# Patient Record
Sex: FEMALE | Race: WHITE | Employment: FULL TIME | URBAN - METROPOLITAN AREA
[De-identification: names, ages, dates, MRNs, and addresses within clinical notes are randomized per-mention and may not be internally consistent; named-entity substitution may affect disease eponyms.]

---

## 2017-01-12 ENCOUNTER — OFFICE VISIT (OUTPATIENT)
Dept: FAMILY MEDICINE CLINIC | Age: 57
End: 2017-01-12

## 2017-01-12 VITALS
HEART RATE: 80 BPM | DIASTOLIC BLOOD PRESSURE: 72 MMHG | RESPIRATION RATE: 16 BRPM | SYSTOLIC BLOOD PRESSURE: 112 MMHG | WEIGHT: 238.4 LBS | TEMPERATURE: 98.4 F | BODY MASS INDEX: 45.05 KG/M2

## 2017-01-12 DIAGNOSIS — J02.9 PHARYNGITIS, UNSPECIFIED ETIOLOGY: ICD-10-CM

## 2017-01-12 DIAGNOSIS — J01.40 ACUTE NON-RECURRENT PANSINUSITIS: ICD-10-CM

## 2017-01-12 DIAGNOSIS — Z72.0 TOBACCO ABUSE: ICD-10-CM

## 2017-01-12 DIAGNOSIS — J20.9 ACUTE BRONCHITIS WITH BRONCHOSPASM: Primary | ICD-10-CM

## 2017-01-12 DIAGNOSIS — R09.89 CHEST CONGESTION: ICD-10-CM

## 2017-01-12 PROCEDURE — 99213 OFFICE O/P EST LOW 20 MIN: CPT | Performed by: NURSE PRACTITIONER

## 2017-01-12 PROCEDURE — 99406 BEHAV CHNG SMOKING 3-10 MIN: CPT | Performed by: NURSE PRACTITIONER

## 2017-01-12 RX ORDER — GUAIFENESIN AND DEXTROMETHORPHAN HYDROBROMIDE 100; 10 MG/5ML; MG/5ML
10 SOLUTION ORAL EVERY 6 HOURS PRN
Qty: 400 ML | Refills: 0 | Status: SHIPPED | OUTPATIENT
Start: 2017-01-12 | End: 2017-01-22

## 2017-01-12 RX ORDER — CEFDINIR 300 MG/1
300 CAPSULE ORAL EVERY 12 HOURS
Qty: 20 CAPSULE | Refills: 0 | Status: SHIPPED | OUTPATIENT
Start: 2017-01-12 | End: 2017-01-22

## 2017-01-12 ASSESSMENT — ENCOUNTER SYMPTOMS
PHOTOPHOBIA: 0
CONSTIPATION: 0
VOMITING: 0
ANAL BLEEDING: 0
BLOOD IN STOOL: 0
ABDOMINAL PAIN: 0
TROUBLE SWALLOWING: 0
WHEEZING: 0
ABDOMINAL DISTENTION: 0
NAUSEA: 0
SORE THROAT: 1
APNEA: 0
DIARRHEA: 0
VOICE CHANGE: 1
COUGH: 1
SHORTNESS OF BREATH: 0
BACK PAIN: 0
SINUS PRESSURE: 1

## 2017-10-06 ENCOUNTER — OFFICE VISIT (OUTPATIENT)
Dept: FAMILY MEDICINE CLINIC | Age: 57
End: 2017-10-06
Payer: COMMERCIAL

## 2017-10-06 VITALS
HEART RATE: 92 BPM | SYSTOLIC BLOOD PRESSURE: 122 MMHG | WEIGHT: 232.5 LBS | BODY MASS INDEX: 43.9 KG/M2 | RESPIRATION RATE: 16 BRPM | HEIGHT: 61 IN | TEMPERATURE: 99 F | DIASTOLIC BLOOD PRESSURE: 84 MMHG

## 2017-10-06 DIAGNOSIS — L03.113 CELLULITIS OF RIGHT UPPER EXTREMITY: Primary | ICD-10-CM

## 2017-10-06 DIAGNOSIS — L98.9 BENIGN SKIN LESION OF NOSE: ICD-10-CM

## 2017-10-06 DIAGNOSIS — Z72.0 TOBACCO ABUSE: ICD-10-CM

## 2017-10-06 PROCEDURE — 99213 OFFICE O/P EST LOW 20 MIN: CPT | Performed by: NURSE PRACTITIONER

## 2017-10-06 RX ORDER — CEPHALEXIN 500 MG/1
500 CAPSULE ORAL EVERY 6 HOURS
Qty: 28 CAPSULE | Refills: 0 | Status: SHIPPED | OUTPATIENT
Start: 2017-10-06 | End: 2017-10-13

## 2017-10-06 RX ORDER — LORATADINE 10 MG/1
10 CAPSULE, LIQUID FILLED ORAL DAILY
Qty: 10 CAPSULE | Refills: 0 | Status: SHIPPED | OUTPATIENT
Start: 2017-10-06 | End: 2017-10-16

## 2017-10-06 ASSESSMENT — ENCOUNTER SYMPTOMS
CONSTIPATION: 0
VOMITING: 0
COUGH: 0
PHOTOPHOBIA: 0
NAUSEA: 0
ABDOMINAL DISTENTION: 0
WHEEZING: 0
SHORTNESS OF BREATH: 0
DIARRHEA: 0
ABDOMINAL PAIN: 0
ANAL BLEEDING: 0
BACK PAIN: 0
TROUBLE SWALLOWING: 0
APNEA: 0
BLOOD IN STOOL: 0
VOICE CHANGE: 0

## 2017-10-06 NOTE — PATIENT INSTRUCTIONS
treatment right away. Follow-up care is a key part of your treatment and safety. Be sure to make and go to all appointments, and call your doctor if you are having problems. It's also a good idea to know your test results and keep a list of the medicines you take. How can you care for yourself at home? · Take your antibiotics as directed. Do not stop taking them just because you feel better. You need to take the full course of antibiotics. · Prop up the infected area on pillows to reduce pain and swelling. Try to keep the area above the level of your heart as often as you can. · If your doctor told you how to care for your wound, follow your doctor's instructions. If you did not get instructions, follow this general advice:  ¨ Wash the wound with clean water 2 times a day. Don't use hydrogen peroxide or alcohol, which can slow healing. ¨ You may cover the wound with a thin layer of petroleum jelly, such as Vaseline, and a nonstick bandage. ¨ Apply more petroleum jelly and replace the bandage as needed. · Be safe with medicines. Take pain medicines exactly as directed. ¨ If the doctor gave you a prescription medicine for pain, take it as prescribed. ¨ If you are not taking a prescription pain medicine, ask your doctor if you can take an over-the-counter medicine. To prevent cellulitis in the future  · Try to prevent cuts, scrapes, or other injuries to your skin. Cellulitis most often occurs where there is a break in the skin. · If you get a scrape, cut, mild burn, or bite, wash the wound with clean water as soon as you can to help avoid infection. Don't use hydrogen peroxide or alcohol, which can slow healing. · If you have swelling in your legs (edema), support stockings and good skin care may help prevent leg sores and cellulitis. · Take care of your feet, especially if you have diabetes or other conditions that increase the risk of infection. Wear shoes and socks. Do not go barefoot.  If you have athlete's foot or other skin problems on your feet, talk to your doctor about how to treat them. When should you call for help? Call your doctor now or seek immediate medical care if:  · You have signs that your infection is getting worse, such as:  ¨ Increased pain, swelling, warmth, or redness. ¨ Red streaks leading from the area. ¨ Pus draining from the area. ¨ A fever. · You get a rash. Watch closely for changes in your health, and be sure to contact your doctor if:  · You are not getting better after 1 day (24 hours). · You do not get better as expected. Where can you learn more? Go to https://PawClinicpepiceweb.Inetec. org and sign in to your Bday account. Enter J654 in the oroeco box to learn more about \"Cellulitis: Care Instructions. \"     If you do not have an account, please click on the \"Sign Up Now\" link. Current as of: October 13, 2016  Content Version: 11.3  © 4088-7172 [employer]. Care instructions adapted under license by [facility] ([facility]). If you have questions about a medical condition or this instruction, always ask your healthcare professional. [place] [address] any warranty or liability for your use of this information. Stopping Smoking: Care Instructions  Your Care Instructions  Cigarette smokers crave the nicotine in cigarettes. Giving it up is much harder than simply changing a habit. Your body has to stop craving the nicotine. It is hard to quit, but you can do it. There are many tools that people use to quit smoking. You may find that combining tools works best for you. There are several steps to quitting. First you get ready to quit. Then you get support to help you. After that, you learn new skills and behaviors to become a nonsmoker. For many people, a necessary step is getting and using medicine. Your doctor will help you set up the plan that best meets your needs.  You may want to attend a smoking cessation program to help you quit smoking. When you choose a program, look for one that has proven success. Ask your doctor for ideas. You will greatly increase your chances of success if you take medicine as well as get counseling or join a cessation program.  Some of the changes you feel when you first quit tobacco are uncomfortable. Your body will miss the nicotine at first, and you may feel short-tempered and grumpy. You may have trouble sleeping or concentrating. Medicine can help you deal with these symptoms. You may struggle with changing your smoking habits and rituals. The last step is the tricky one: Be prepared for the smoking urge to continue for a time. This is a lot to deal with, but keep at it. You will feel better. Follow-up care is a key part of your treatment and safety. Be sure to make and go to all appointments, and call your doctor if you are having problems. Its also a good idea to know your test results and keep a list of the medicines you take. How can you care for yourself at home? · Ask your family, friends, and coworkers for support. You have a better chance of quitting if you have help and support. · Join a support group, such as Nicotine Anonymous, for people who are trying to quit smoking. · Consider signing up for a smoking cessation program, such as the American Lung Association's Freedom from Smoking program.  · Set a quit date. Pick your date carefully so that it is not right in the middle of a big deadline or stressful time. Once you quit, do not even take a puff. Get rid of all ashtrays and lighters after your last cigarette. Clean your house and your clothes so that they do not smell of smoke. · Learn how to be a nonsmoker. Think about ways you can avoid those things that make you reach for a cigarette. ¨ Avoid situations that put you at greatest risk for smoking. For some people, it is hard to have a drink with friends without smoking.  For others, they might skip a coffee break with coworkers who smoke. ¨ Change your daily routine. Take a different route to work or eat a meal in a different place. · Cut down on stress. Calm yourself or release tension by doing an activity you enjoy, such as reading a book, taking a hot bath, or gardening. · Talk to your doctor or pharmacist about nicotine replacement therapy, which replaces the nicotine in your body. You still get nicotine but you do not use tobacco. Nicotine replacement products help you slowly reduce the amount of nicotine you need. These products come in several forms, many of them available over-the-counter:  ¨ Nicotine patches  ¨ Nicotine gum and lozenges  ¨ Nicotine inhaler  · Ask your doctor about bupropion (Wellbutrin) or varenicline (Chantix), which are prescription medicines. They do not contain nicotine. They help you by reducing withdrawal symptoms, such as stress and anxiety. · Some people find hypnosis, acupuncture, and massage helpful for ending the smoking habit. · Eat a healthy diet and get regular exercise. Having healthy habits will help your body move past its craving for nicotine. · Be prepared to keep trying. Most people are not successful the first few times they try to quit. Do not get mad at yourself if you smoke again. Make a list of things you learned and think about when you want to try again, such as next week, next month, or next year. Where can you learn more? Go to https://American-Albanian Hemp Companylaurel.Ecrio. org and sign in to your iCracked account. Enter Y849 in the [facility] box to learn more about \"Stopping Smoking: Care Instructions. \"     If you do not have an account, please click on the \"Sign Up Now\" link. Current as of: March 20, 2017  Content Version: 11.3  © 6927-1773 [employer]. Care instructions adapted under license by [address]  If you have questions about a medical condition or this instruction, always ask your healthcare professional. Stacy Grullon disclaims any warranty or liability for your use of this information.

## 2017-10-06 NOTE — MR AVS SNAPSHOT
After Visit Summary             Amanda Ragsdale   10/6/2017 2:30 PM   Office Visit    Description:  Female : 1960   Provider:  Laure Martinez CNP   Department:  [facility]              Your Follow-Up and Future Appointments         Below is a list of your follow-up and future appointments. This may not be a complete list as you may have made appointments directly with providers that we are not aware of or your providers may have made some for you. Please call your providers to confirm appointments. It is important to keep your appointments. Please bring your current insurance card, photo ID, co-pay, and all medication bottles to your appointment. If self-pay, payment is expected at the time of service. Your To-Do List     Follow-Up    Return if symptoms worsen or fail to improve. Information from Your Visit        Department     Name Address Phone [facility] [address] [place], [place] [phone]      You Were Seen for:         Comments    Cellulitis of right upper extremity   [[id number]]         Vital Signs     Blood Pressure Pulse Temperature Respirations Height Weight    122/84 (Site: Right Arm, Position: Sitting) 92 99 °F (37.2 °C) (Oral) 16 5' 1\" (1.549 m) 232 lb 8 oz (105.5 kg)    Body Mass Index Smoking Status                43.93 kg/m2 Current Every Day Smoker          Additional Information about your Body Mass Index (BMI)           Your BMI as listed above is considered obese (30 or more). BMI is an estimate of body fat, calculated from your height and weight. The higher your BMI, the greater your risk of heart disease, high blood pressure, type 2 diabetes, stroke, gallstones, arthritis, sleep apnea, and certain cancers. BMI is not perfect. It may overestimate body fat in athletes and people who are more muscular.   Even a small weight loss (between 5 and 10 percent of your current weight) by decreasing your calorie intake and becoming more physically active will help lower your risk of developing or worsening diseases associated with obesity. Learn more at: Smartling.uk          Instructions        Orders Placed:  No orders of the defined types were placed in this encounter. Medications Prescribed:  Orders Placed This Encounter   Medications    mupirocin (BACTROBAN) 2 % ointment     Sig: Apply 3 times daily to affected areas. Dispense:  15 g     Refill:  0    cephALEXin (KEFLEX) 500 MG capsule     Sig: Take 1 capsule by mouth every 6 hours for 7 days     Dispense:  28 capsule     Refill:  0    loratadine (CLARITIN) 10 MG capsule     Sig: Take 1 capsule by mouth daily for 10 days     Dispense:  10 capsule     Refill:  0       Claritin 10 mg orally once a day for 10 days. Cephalexin 500 mg orally every 6 hours for 7 days. Take a probiotic (such as the store brand equivalent of Align, Culturelle or Florastor) daily while you are taking the antibiotic. Separate the probiotic and the antibiotic by at least 2 hours. Continue taking the probiotic for 2 weeks after completing the antibiotic. Do NOT take probiotics if you are immunocompromised (i.e., on chemotherapy or taking immunosuppressive drugs following a transplant). Be aware that some probiotics contain gluten. If you are intolerant of gluten, check with the pharmacist before your purchase of probiotics. Apply hydrocortisone 1% cream to right arm two times per day for 7 days. Apply mupirocin 2% ointment to rash on nose and chin three times per day for 7 days. Apply ice to affected area for 15 minutes three times per day for 2 days then apply moist heat to area for 15 minutes three times per day. Smoking cessation is advised. Call with any concerns. Return if symptoms worsen or fail to improve.          Cellulitis: Care Instructions  Your Care Instructions Cellulitis is a skin infection. It often occurs after a break in the skin from a scrape, cut, bite, or puncture, or after a rash. The doctor has checked you carefully, but problems can develop later. If you notice any problems or new symptoms, get medical treatment right away. Follow-up care is a key part of your treatment and safety. Be sure to make and go to all appointments, and call your doctor if you are having problems. It's also a good idea to know your test results and keep a list of the medicines you take. How can you care for yourself at home? · Take your antibiotics as directed. Do not stop taking them just because you feel better. You need to take the full course of antibiotics. · Prop up the infected area on pillows to reduce pain and swelling. Try to keep the area above the level of your heart as often as you can. · If your doctor told you how to care for your wound, follow your doctor's instructions. If you did not get instructions, follow this general advice:  ¨ Wash the wound with clean water 2 times a day. Don't use hydrogen peroxide or alcohol, which can slow healing. ¨ You may cover the wound with a thin layer of petroleum jelly, such as Vaseline, and a nonstick bandage. ¨ Apply more petroleum jelly and replace the bandage as needed. · Be safe with medicines. Take pain medicines exactly as directed. ¨ If the doctor gave you a prescription medicine for pain, take it as prescribed. ¨ If you are not taking a prescription pain medicine, ask your doctor if you can take an over-the-counter medicine. To prevent cellulitis in the future  · Try to prevent cuts, scrapes, or other injuries to your skin. Cellulitis most often occurs where there is a break in the skin. · If you get a scrape, cut, mild burn, or bite, wash the wound with clean water as soon as you can to help avoid infection. Don't use hydrogen peroxide or alcohol, which can slow healing. There are several steps to quitting. First you get ready to quit. Then you get support to help you. After that, you learn new skills and behaviors to become a nonsmoker. For many people, a necessary step is getting and using medicine. Your doctor will help you set up the plan that best meets your needs. You may want to attend a smoking cessation program to help you quit smoking. When you choose a program, look for one that has proven success. Ask your doctor for ideas. You will greatly increase your chances of success if you take medicine as well as get counseling or join a cessation program.  Some of the changes you feel when you first quit tobacco are uncomfortable. Your body will miss the nicotine at first, and you may feel short-tempered and grumpy. You may have trouble sleeping or concentrating. Medicine can help you deal with these symptoms. You may struggle with changing your smoking habits and rituals. The last step is the tricky one: Be prepared for the smoking urge to continue for a time. This is a lot to deal with, but keep at it. You will feel better. Follow-up care is a key part of your treatment and safety. Be sure to make and go to all appointments, and call your doctor if you are having problems. Its also a good idea to know your test results and keep a list of the medicines you take. How can you care for yourself at home? · Ask your family, friends, and coworkers for support. You have a better chance of quitting if you have help and support. · Join a support group, such as Nicotine Anonymous, for people who are trying to quit smoking. · Consider signing up for a smoking cessation program, such as the American Lung Association's Freedom from Smoking program.  · Set a quit date. Pick your date carefully so that it is not right in the middle of a big deadline or stressful time. Once you quit, do not even take a puff.  Get rid of all ashtrays and lighters after your last cigarette. Clean your house and your clothes so that they do not smell of smoke. · Learn how to be a nonsmoker. Think about ways you can avoid those things that make you reach for a cigarette. ¨ Avoid situations that put you at greatest risk for smoking. For some people, it is hard to have a drink with friends without smoking. For others, they might skip a coffee break with coworkers who smoke. ¨ Change your daily routine. Take a different route to work or eat a meal in a different place. · Cut down on stress. Calm yourself or release tension by doing an activity you enjoy, such as reading a book, taking a hot bath, or gardening. · Talk to your doctor or pharmacist about nicotine replacement therapy, which replaces the nicotine in your body. You still get nicotine but you do not use tobacco. Nicotine replacement products help you slowly reduce the amount of nicotine you need. These products come in several forms, many of them available over-the-counter:  ¨ Nicotine patches  ¨ Nicotine gum and lozenges  ¨ Nicotine inhaler  · Ask your doctor about bupropion (Wellbutrin) or varenicline (Chantix), which are prescription medicines. They do not contain nicotine. They help you by reducing withdrawal symptoms, such as stress and anxiety. · Some people find hypnosis, acupuncture, and massage helpful for ending the smoking habit. · Eat a healthy diet and get regular exercise. Having healthy habits will help your body move past its craving for nicotine. · Be prepared to keep trying. Most people are not successful the first few times they try to quit. Do not get mad at yourself if you smoke again. Make a list of things you learned and think about when you want to try again, such as next week, next month, or next year. Where can you learn more? Go to https://leonardo.healthHaloSource. org and sign in to your Gecko account.  Enter C758 in the Ingresse box to learn more about \"Stopping Smoking: Care Instructions. \"     If you do not have an account, please click on the \"Sign Up Now\" link. Current as of: March 20, 2017  Content Version: 11.3  © 6643-7147 [employer]. Care instructions adapted under license by [facility] ([facility]). If you have questions about a medical condition or this instruction, always ask your healthcare professional. [place] [address] any warranty or liability for your use of this information. Today's Medication Changes          These changes are accurate as of: 10/6/17  2:58 PM.  If you have any questions, ask your nurse or doctor. START taking these medications           cephALEXin 500 MG capsule   Commonly known as:  KEFLEX   Instructions: Take 1 capsule by mouth every 6 hours for 7 days   Quantity:  28 capsule   Refills:  0   Started by:  Lubna Baker CNP       loratadine 10 MG capsule   Commonly known as:  CLARITIN   Instructions: Take 1 capsule by mouth daily for 10 days   Quantity:  10 capsule   Refills:  0   Started by:  Lubna Baker CNP       mupirocin 2 % ointment   Commonly known as:  BACTROBAN   Instructions:  Apply 3 times daily to affected areas.    Quantity:  15 g   Refills:  0   Started by:  Lubna Baker CNP         STOP taking these medications           atorvastatin 10 MG tablet   Commonly known as:  LIPITOR   Stopped by:  Lubna Baker CNP       trimethoprim-polymyxin b 50003-1.1 UNIT/ML-% ophthalmic solution   Commonly known as:  POLYTRIM   Stopped by:  Lubna Baker CNP            Where to Get Your Medications      These medications were sent to [address] [place], [place] [address] [phone]  [facility] [address], [facility] [address]     Phone:  [phone]     cephALEXin 500 MG capsule    loratadine 10 MG capsule    mupirocin 2 % ointment               Your Current Medications Are mupirocin (BACTROBAN) 2 % ointment Apply 3 times daily to affected areas. cephALEXin (KEFLEX) 500 MG capsule Take 1 capsule by mouth every 6 hours for 7 days    loratadine (CLARITIN) 10 MG capsule Take 1 capsule by mouth daily for 10 days    ibuprofen (ADVIL;MOTRIN) 200 MG tablet Take 200 mg by mouth every 6 hours as needed for Pain Taking 3 tabs      Allergies              Eggs Or Egg-derived Products Other (See Comments)    Abdominal Pain- Non Pasturized eggs         Additional Information        Basic Information     Date Of Birth Sex Race Ethnicity Preferred Language    1960 Female White Non-/Non  English      Problem List as of 10/6/2017  Date Reviewed: 10/6/2017                IFG (impaired fasting glucose)    Hyperlipidemia    Tobacco abuse    Morbid obesity ([facility] [address]      Preventive Care        Date Due    Tetanus Combination Vaccine (1 - Tdap) 10/30/1977    Pneumococcal Vaccine - Pneumovax for adults aged 19-64 years with: chronic heart disease, chronic lung disease, diabetes mellitus, alcoholism, chronic liver disease, or cigarette smoking. (1 of 1 - PPSV23) 10/30/1977    Colonoscopy 10/30/2008    Yearly Flu Vaccine (1) 9/1/2017    Pap Smear 3/11/2018    Mammograms are recommended every 2 years for low/average risk patients aged 48 - 69, and every year for high risk patients per updated national guidelines. However these guidelines can be individualized by your provider. 4/1/2018    Diabetes Screening 4/1/2019    Cholesterol Screening 3/15/2021            [employer] Signup           [employer] Corporationhart allows you to send messages to your doctor, view your test results, renew your prescriptions, schedule appointments, view visit notes, and more. How Do I Sign Up? 1. In your Internet browser, go to [email or web address]  2. Click on the Sign Up Now link in the Sign In box. You will see the New Member Sign Up page. 3. Enter your cuaQea Access Code exactly as it appears below. You will not need to use this code after youve completed the sign-up process. If you do not sign up before the expiration date, you must request a new code. cuaQea Access Code: [email or web address]  Expires: 12/5/2017  2:58 PM    4. Enter your Social Security Number ([id number]) and Date of Birth (mm/dd/yyyy) as indicated and click Submit. You will be taken to the next sign-up page. 5. Create a cuaQea ID. This will be your cuaQea login ID and cannot be changed, so think of one that is secure and easy to remember. 6. Create a cuaQea password. You can change your password at any time. 7. Enter your Password Reset Question and Answer. This can be used at a later time if you forget your password. 8. Enter your e-mail address. You will receive e-mail notification when new information is available in [address]. 9. Click Sign Up. You can now view your medical record. Additional Information  If you have questions, please contact the physician practice where you receive care. Remember, cuaQea is NOT to be used for urgent needs. For medical emergencies, dial 911. For questions regarding your cuaQea account call [phone]. If you have a clinical question, please call your doctor's office.

## 2017-10-06 NOTE — PROGRESS NOTES
Shon Monzon is a 60 y. o.female is here today for:  Chief Complaint   Patient presents with    Flu Vaccine     here today for flu shot area red,swollen,hot and itchy- right deltoid- done at [facility] Other     nose turns red on right side, hurts         The patient is here today with a complaint of erythema, warmth, pain and itching of the RUE after receiving an influenza vaccine. She received her influenza vaccine on 10/4/17. The patient noted her current symptoms on 10/5/17. The patient has been afebrile. She reports having intermittent erythema, itching and skin breakouts on the right side of her nose. The symptoms began after her sinus surgery three years ago and occur intermittently. Review of Systems   Constitutional: Negative for activity change, appetite change, diaphoresis, fatigue, fever and unexpected weight change. HENT: Negative for dental problem, hearing loss, nosebleeds, tinnitus, trouble swallowing and voice change. Eyes: Negative for photophobia and visual disturbance. Respiratory: Negative for apnea, cough, shortness of breath and wheezing. Cardiovascular: Negative for chest pain, palpitations and leg swelling. Gastrointestinal: Negative for abdominal distention, abdominal pain, anal bleeding, blood in stool, constipation, diarrhea, nausea and vomiting. Genitourinary: Negative for difficulty urinating, dysuria, frequency, hematuria, pelvic pain, urgency, vaginal bleeding and vaginal discharge. Musculoskeletal: Negative for arthralgias, back pain, joint swelling, myalgias and neck stiffness. Skin: Negative for rash. Erythema right upper arm. Allergic/Immunologic: Negative for immunocompromised state. Neurological: Negative for dizziness, tremors, seizures, syncope, weakness and headaches. Hematological: Negative for adenopathy. Does not bruise/bleed easily.    Psychiatric/Behavioral: Negative for decreased concentration, dysphoric mood,

## 2017-12-06 ENCOUNTER — OFFICE VISIT (OUTPATIENT)
Dept: FAMILY MEDICINE CLINIC | Age: 57
End: 2017-12-06
Payer: COMMERCIAL

## 2017-12-06 VITALS
HEIGHT: 61 IN | HEART RATE: 88 BPM | RESPIRATION RATE: 18 BRPM | TEMPERATURE: 99.1 F | WEIGHT: 234.8 LBS | BODY MASS INDEX: 44.33 KG/M2 | DIASTOLIC BLOOD PRESSURE: 70 MMHG | SYSTOLIC BLOOD PRESSURE: 118 MMHG

## 2017-12-06 DIAGNOSIS — Z72.0 TOBACCO ABUSE: ICD-10-CM

## 2017-12-06 DIAGNOSIS — J01.41 ACUTE RECURRENT PANSINUSITIS: Primary | ICD-10-CM

## 2017-12-06 PROCEDURE — 99213 OFFICE O/P EST LOW 20 MIN: CPT | Performed by: FAMILY MEDICINE

## 2017-12-06 RX ORDER — AMOXICILLIN AND CLAVULANATE POTASSIUM 875; 125 MG/1; MG/1
1 TABLET, FILM COATED ORAL 2 TIMES DAILY
Qty: 20 TABLET | Refills: 0 | Status: SHIPPED | OUTPATIENT
Start: 2017-12-06 | End: 2017-12-16

## 2017-12-06 ASSESSMENT — ENCOUNTER SYMPTOMS
SORE THROAT: 1
GASTROINTESTINAL NEGATIVE: 1
COUGH: 1
SINUS PAIN: 1
SINUS PRESSURE: 1
RHINORRHEA: 1

## 2017-12-06 NOTE — LETTER
[facility]  [address] [place] [address] Rd., [address] [id number]  Phone: [phone]  Fax: [phone]    Nish Israel MD        December 6, 2017     Patient: Amanda Ragsdale   YOB: 1960   Date of Visit: 12/6/2017       To Whom It May Concern: It is my medical opinion that Ciara Awad may return to work on 12/7/17. If you have any questions or concerns, please don't hesitate to call.     Sincerely,        Nish Israel MD

## 2018-04-22 ENCOUNTER — HOSPITAL ENCOUNTER (EMERGENCY)
Age: 58
Discharge: HOME | End: 2018-04-22
Payer: COMMERCIAL

## 2018-04-22 VITALS
HEART RATE: 80 BPM | TEMPERATURE: 99.2 F | OXYGEN SATURATION: 95 % | WEIGHT: 207 LBS | BODY MASS INDEX: 39.11 KG/M2 | DIASTOLIC BLOOD PRESSURE: 66 MMHG | SYSTOLIC BLOOD PRESSURE: 129 MMHG | RESPIRATION RATE: 16 BRPM

## 2018-04-22 DIAGNOSIS — J06.9 ACUTE UPPER RESPIRATORY INFECTION: Primary | ICD-10-CM

## 2018-04-22 LAB
GROUP A STREP CULTURE, REFLEX: NEGATIVE
REFLEX THROAT C + S: NORMAL

## 2018-04-22 PROCEDURE — 99214 OFFICE O/P EST MOD 30 MIN: CPT

## 2018-04-22 PROCEDURE — 99213 OFFICE O/P EST LOW 20 MIN: CPT | Performed by: NURSE PRACTITIONER

## 2018-04-22 PROCEDURE — 87070 CULTURE OTHR SPECIMN AEROBIC: CPT

## 2018-04-22 RX ORDER — CEFDINIR 300 MG/1
300 CAPSULE ORAL 2 TIMES DAILY
Qty: 20 CAPSULE | Refills: 0 | Status: SHIPPED | OUTPATIENT
Start: 2018-04-22 | End: 2018-05-02

## 2018-04-22 ASSESSMENT — ENCOUNTER SYMPTOMS
DIARRHEA: 0
ABDOMINAL PAIN: 0
SHORTNESS OF BREATH: 0
COUGH: 1
WHEEZING: 0
NAUSEA: 0
SINUS PRESSURE: 1
SINUS PAIN: 1
SORE THROAT: 1
CONSTIPATION: 0

## 2018-04-22 ASSESSMENT — PAIN DESCRIPTION - LOCATION: LOCATION: THROAT

## 2018-04-22 ASSESSMENT — PAIN DESCRIPTION - FREQUENCY: FREQUENCY: CONTINUOUS

## 2018-04-22 ASSESSMENT — PAIN DESCRIPTION - DESCRIPTORS: DESCRIPTORS: ACHING

## 2018-04-22 ASSESSMENT — PAIN DESCRIPTION - PAIN TYPE: TYPE: ACUTE PAIN

## 2018-04-22 ASSESSMENT — PAIN SCALES - GENERAL: PAINLEVEL_OUTOF10: 5

## 2018-04-24 LAB — THROAT/NOSE CULTURE: NORMAL

## 2018-04-26 ENCOUNTER — OFFICE VISIT (OUTPATIENT)
Dept: FAMILY MEDICINE CLINIC | Age: 58
End: 2018-04-26
Payer: COMMERCIAL

## 2018-04-26 VITALS
HEART RATE: 88 BPM | DIASTOLIC BLOOD PRESSURE: 82 MMHG | TEMPERATURE: 98.5 F | RESPIRATION RATE: 16 BRPM | WEIGHT: 240.3 LBS | BODY MASS INDEX: 45.37 KG/M2 | HEIGHT: 61 IN | SYSTOLIC BLOOD PRESSURE: 132 MMHG

## 2018-04-26 DIAGNOSIS — J06.9 VIRAL URI WITH COUGH: Primary | ICD-10-CM

## 2018-04-26 PROCEDURE — G0444 DEPRESSION SCREEN ANNUAL: HCPCS | Performed by: FAMILY MEDICINE

## 2018-04-26 PROCEDURE — 99213 OFFICE O/P EST LOW 20 MIN: CPT | Performed by: FAMILY MEDICINE

## 2018-04-26 ASSESSMENT — PATIENT HEALTH QUESTIONNAIRE - PHQ9
2. FEELING DOWN, DEPRESSED OR HOPELESS: 3
7. TROUBLE CONCENTRATING ON THINGS, SUCH AS READING THE NEWSPAPER OR WATCHING TELEVISION: 3
10. IF YOU CHECKED OFF ANY PROBLEMS, HOW DIFFICULT HAVE THESE PROBLEMS MADE IT FOR YOU TO DO YOUR WORK, TAKE CARE OF THINGS AT HOME, OR GET ALONG WITH OTHER PEOPLE: 1
3. TROUBLE FALLING OR STAYING ASLEEP: 3
8. MOVING OR SPEAKING SO SLOWLY THAT OTHER PEOPLE COULD HAVE NOTICED. OR THE OPPOSITE, BEING SO FIGETY OR RESTLESS THAT YOU HAVE BEEN MOVING AROUND A LOT MORE THAN USUAL: 0
SUM OF ALL RESPONSES TO PHQ QUESTIONS 1-9: 17
4. FEELING TIRED OR HAVING LITTLE ENERGY: 3
1. LITTLE INTEREST OR PLEASURE IN DOING THINGS: 3
6. FEELING BAD ABOUT YOURSELF - OR THAT YOU ARE A FAILURE OR HAVE LET YOURSELF OR YOUR FAMILY DOWN: 0
SUM OF ALL RESPONSES TO PHQ9 QUESTIONS 1 & 2: 6
9. THOUGHTS THAT YOU WOULD BE BETTER OFF DEAD, OR OF HURTING YOURSELF: 0
5. POOR APPETITE OR OVEREATING: 2

## 2018-04-26 ASSESSMENT — ENCOUNTER SYMPTOMS
SHORTNESS OF BREATH: 0
VOMITING: 0
DIARRHEA: 0
ABDOMINAL PAIN: 0
CONSTIPATION: 0
BLOOD IN STOOL: 0
ANAL BLEEDING: 0
CHEST TIGHTNESS: 0
NAUSEA: 1

## 2018-07-19 ENCOUNTER — OFFICE VISIT (OUTPATIENT)
Dept: FAMILY MEDICINE CLINIC | Age: 58
End: 2018-07-19
Payer: COMMERCIAL

## 2018-07-19 VITALS
TEMPERATURE: 99.3 F | WEIGHT: 246 LBS | HEART RATE: 84 BPM | BODY MASS INDEX: 46.48 KG/M2 | RESPIRATION RATE: 16 BRPM | SYSTOLIC BLOOD PRESSURE: 126 MMHG | DIASTOLIC BLOOD PRESSURE: 72 MMHG

## 2018-07-19 DIAGNOSIS — J06.9 URI WITH COUGH AND CONGESTION: Primary | ICD-10-CM

## 2018-07-19 DIAGNOSIS — Z72.0 TOBACCO ABUSE: ICD-10-CM

## 2018-07-19 PROCEDURE — 99213 OFFICE O/P EST LOW 20 MIN: CPT | Performed by: NURSE PRACTITIONER

## 2018-07-19 RX ORDER — PREDNISONE 20 MG/1
20 TABLET ORAL 2 TIMES DAILY
Qty: 10 TABLET | Refills: 0 | Status: SHIPPED | OUTPATIENT
Start: 2018-07-19 | End: 2018-07-24

## 2018-07-19 RX ORDER — BENZONATATE 100 MG/1
100 CAPSULE ORAL 3 TIMES DAILY PRN
Qty: 21 CAPSULE | Refills: 0 | Status: SHIPPED | OUTPATIENT
Start: 2018-07-19 | End: 2018-07-26

## 2018-07-19 RX ORDER — AMOXICILLIN 500 MG/1
500 CAPSULE ORAL 2 TIMES DAILY
Qty: 20 CAPSULE | Refills: 0 | Status: SHIPPED | OUTPATIENT
Start: 2018-07-19 | End: 2018-07-24 | Stop reason: ALTCHOICE

## 2018-07-19 ASSESSMENT — ENCOUNTER SYMPTOMS
DIARRHEA: 0
NAUSEA: 0
SORE THROAT: 0
SINUS PAIN: 0
SHORTNESS OF BREATH: 0
COUGH: 1
WHEEZING: 0
CONSTIPATION: 0
ABDOMINAL PAIN: 0

## 2018-07-19 NOTE — PROGRESS NOTES
normal strength. No cranial nerve deficit. Coordination and gait normal. GCS eye subscore is 4. GCS verbal subscore is 5. GCS motor subscore is 6. Skin: Skin is warm and dry. She is not diaphoretic. Psychiatric: She has a normal mood and affect. Her speech is normal and behavior is normal. Judgment and thought content normal. Cognition and memory are normal.   Nursing note and vitals reviewed. Assessment/Plan:      Juan Chacon was seen today for cough and congestion. She appears ill and tired, is nontoxic. Expiratory wheezing throughout all lung fields, TMs and posterior oropharynx normal and shows no cervical adenopathy. Patient does not have a formal history of COPD, but she has the hallmarks. Will be treated with an antibiotic and given the day off work. Diagnoses and all orders for this visit:    URI with cough and congestion  -     amoxicillin (AMOXIL) 500 MG capsule; Take 1 capsule by mouth 2 times daily for 10 days  -     predniSONE (DELTASONE) 20 MG tablet; Take 1 tablet by mouth 2 times daily for 5 days  -     benzonatate (TESSALON PERLES) 100 MG capsule; Take 1 capsule by mouth 3 times daily as needed for Cough    Tobacco abuse  -     predniSONE (DELTASONE) 20 MG tablet; Take 1 tablet by mouth 2 times daily for 5 days        Return if symptoms worsen or fail to improve. Patient instructions given and reviewed.         Electronically signed by NIKI Kraft CNP on 7/19/2018 at 11:52 AM

## 2018-07-19 NOTE — PATIENT INSTRUCTIONS
Patient Education        Learning About the Safe Use of Antibiotics  Introduction    Antibiotics are drugs used to kill bacteria. Bacteria can cause infections. These include strep throat, ear infections, and pneumonia. These medicines can't cure everything. They don't kill viruses or help with allergies. They don't help illnesses such as the common cold, the flu, or a runny nose. And they can cause side effects. There are many types of antibiotics. Your doctor will decide which one will work best for your infection. Examples include:  · Amoxicillin. · Cephalexin (Keflex). · Ciprofloxacin (Cipro). What are the possible side effects? Side effects can include:  · Nausea. · Diarrhea. · Skin rash. · Yeast infection. · A severe allergic reaction. It may cause itching, swelling, and breathing problems. This is rare. You may have other side effects or reactions not listed here. Check the information that comes with your medicine. Should you take antibiotics just in case? Don't take antibiotics when you don't need them. If you do that, they may not work when you do need them. Each time you take antibiotics, you are more likely to have some bacteria that survive and aren't killed by the medicine. Bacteria that don't die can change and become even harder to kill. These are called antibiotic-resistant bacteria. They can cause longer and more serious infections. To treat them, you may need different, stronger antibiotics that have more side effects and may cost more. So always ask your doctor if antibiotics are the best treatment. Explain that you do not want antibiotics unless you need them. Help protect the community  Using antibiotics when they're not needed leads to the development of antibiotic-resistant bacteria. These tougher bacteria can spread to family members, children, and coworkers.  People in your community will have a risk of getting an infection that is harder to cure and that costs more to infections at home? · Do not smoke. Avoid secondhand smoke. · Take an over-the-counter pain medicine, such as acetaminophen (Tylenol), ibuprofen (Advil, Motrin), or naproxen (Aleve). Read and follow all instructions on the label. · Be careful when taking over-the-counter cold or flu medicines and Tylenol at the same time. Many of these medicines have acetaminophen, which is Tylenol. Read the labels to make sure that you are not taking more than the recommended dose. Too much acetaminophen (Tylenol) can be harmful. · If your doctor prescribed antibiotics, take them as directed. Do not stop taking them just because you feel better. You need to take the full course of antibiotics. · Ask your doctor about cough medicines and decongestants. Some doctors recommend these medicines, while others feel that they do not help. · Learn breathing techniques for COPD, such as breathing through pursed lips. These techniques can help you breathe easier. What can you do to prevent these infections? Stay healthy  · Do not smoke. This is the most important step you can take to prevent more damage to your lungs. If you need help quitting, talk to your doctor about stop-smoking programs and medicines. These can increase your chances of quitting for good. · Avoid secondhand smoke, air pollution, and high altitudes. Also avoid cold, dry air and hot, humid air. Stay at home with your windows closed when air pollution is bad. · Get a flu shot every year. · Get a pneumococcal vaccine shot. If you have had one before, ask your doctor whether you need another dose. · If you must be around people with colds or the flu, wash your hands often. Exercise and eat well  · If your doctor recommends it, get more exercise. Walking is a good choice. Bit by bit, increase the amount you walk every day. Try for at least 30 minutes on most days of the week. · Eat regular, well-balanced meals.  Eating right keeps your energy levels up and helps your body fight infection. · Get plenty of rest and sleep. Follow-up care is a key part of your treatment and safety. Be sure to make and go to all appointments, and call your doctor if you are having problems. It's also a good idea to know your test results and keep a list of the medicines you take. Where can you learn more? Go to https://JavaJobspepiceweb.[email or web address].com. org and sign in to your CloudBees account. Enter R559 in the [employer] box to learn more about \"Learning About COPD and Upper Respiratory Infections. \"     If you do not have an account, please click on the \"Sign Up Now\" link. Current as of: December 6, 2017  Content Version: 11.6  © 4712-5694 [employer]. Care instructions adapted under license by [facility] ([facility]). If you have questions about a medical condition or this instruction, always ask your healthcare professional. [place] [address] any warranty or liability for your use of this information.

## 2018-07-21 ENCOUNTER — HOSPITAL ENCOUNTER (EMERGENCY)
Dept: GENERAL RADIOLOGY | Age: 58
Discharge: HOME | End: 2018-07-21
Payer: COMMERCIAL

## 2018-07-21 ENCOUNTER — HOSPITAL ENCOUNTER (EMERGENCY)
Age: 58
Discharge: HOME | End: 2018-07-21
Payer: COMMERCIAL

## 2018-07-21 VITALS
RESPIRATION RATE: 22 BRPM | WEIGHT: 246 LBS | HEART RATE: 82 BPM | DIASTOLIC BLOOD PRESSURE: 88 MMHG | TEMPERATURE: 99.1 F | SYSTOLIC BLOOD PRESSURE: 142 MMHG | BODY MASS INDEX: 46.48 KG/M2 | OXYGEN SATURATION: 94 %

## 2018-07-21 DIAGNOSIS — R06.2 WHEEZING: ICD-10-CM

## 2018-07-21 DIAGNOSIS — R06.02 SHORTNESS OF BREATH: Primary | ICD-10-CM

## 2018-07-21 DIAGNOSIS — R05.9 COUGH: ICD-10-CM

## 2018-07-21 DIAGNOSIS — Z72.0 TOBACCO USE: ICD-10-CM

## 2018-07-21 LAB
ABSOLUTE BASO #: 0 /CMM (ref 0–200)
ABSOLUTE EOS #: 0 /CMM (ref 0–500)
ABSOLUTE LYMPH #: 700 /CMM (ref 1000–4800)
ABSOLUTE MONO #: 400 /CMM (ref 0–800)
ABSOLUTE NEUT #: 7700 /CMM (ref 1800–7700)
ANION GAP SERPL CALCULATED.3IONS-SCNC: 10 MMOL/L (ref 4–12)
B-TYPE NATRIURETIC PEPTIDE: 55 PG/ML (ref 0–100)
BASOPHILS RELATIVE PERCENT: 0.3 % (ref 0–2)
BUN BLDV-MCNC: 9 MG/DL (ref 7–20)
CALCIUM SERPL-MCNC: 8.7 MG/DL (ref 8.8–10.5)
CHLORIDE BLD-SCNC: 102 MEQ/L (ref 101–111)
CO2: 26 MEQ/L (ref 21–32)
CREAT SERPL-MCNC: 0.74 MG/DL (ref 0.6–1.3)
CREATININE CLEARANCE: >60
EKG ATRIAL RATE: 73 BPM
EKG P AXIS: 58 DEGREES
EKG P-R INTERVAL: 152 MS
EKG Q-T INTERVAL: 380 MS
EKG QRS DURATION: 74 MS
EKG QTC CALCULATION (BAZETT): 418 MS
EKG R AXIS: 30 DEGREES
EKG T AXIS: 40 DEGREES
EKG VENTRICULAR RATE: 73 BPM
EOSINOPHILS RELATIVE PERCENT: 0.1 % (ref 0–6)
GLUCOSE: 119 MG/DL (ref 70–110)
HCT VFR BLD CALC: 40.8 % (ref 35–44)
HEMOGLOBIN: 13.5 GM/DL (ref 12–15)
LYMPHOCYTES RELATIVE PERCENT: 8.2 % (ref 15–45)
MCH RBC QN AUTO: 27 PG (ref 27.5–33)
MCHC RBC AUTO-ENTMCNC: 33.1 GM/DL (ref 33–36)
MCV RBC AUTO: 81.6 CU MIC (ref 80–97)
MONOCYTES RELATIVE PERCENT: 4.6 % (ref 2–10)
NEUTROPHILS RELATIVE PERCENT: 86.8 % (ref 40–70)
NUCLEATED RBCS: 0 /100 WBC
PDW BLD-RTO: 16.4 % (ref 12–16)
PLATELET # BLD: 174 TH/CMM (ref 150–400)
POTASSIUM SERPL-SCNC: 3.1 MEQ/L (ref 3.6–5)
RBC # BLD: 5.01 MIL/CMM (ref 4–5.1)
SODIUM BLD-SCNC: 138 MEQ/L (ref 135–145)
TROPONIN I: 0.01 NG/ML (ref 0–0.02)
WBC # BLD: 8.8 TH/CMM (ref 4.4–10.5)

## 2018-07-21 PROCEDURE — 99214 OFFICE O/P EST MOD 30 MIN: CPT | Performed by: NURSE PRACTITIONER

## 2018-07-21 PROCEDURE — 6360000002 HC RX W HCPCS: Performed by: NURSE PRACTITIONER

## 2018-07-21 PROCEDURE — 2709999900 HC NON-CHARGEABLE SUPPLY

## 2018-07-21 PROCEDURE — 93005 ELECTROCARDIOGRAM TRACING: CPT | Performed by: NURSE PRACTITIONER

## 2018-07-21 PROCEDURE — 96372 THER/PROPH/DIAG INJ SC/IM: CPT

## 2018-07-21 PROCEDURE — 99215 OFFICE O/P EST HI 40 MIN: CPT

## 2018-07-21 PROCEDURE — 93010 ELECTROCARDIOGRAM REPORT: CPT | Performed by: INTERNAL MEDICINE

## 2018-07-21 PROCEDURE — 71046 X-RAY EXAM CHEST 2 VIEWS: CPT

## 2018-07-21 PROCEDURE — 6370000000 HC RX 637 (ALT 250 FOR IP): Performed by: NURSE PRACTITIONER

## 2018-07-21 RX ORDER — IPRATROPIUM BROMIDE AND ALBUTEROL SULFATE 2.5; .5 MG/3ML; MG/3ML
1 SOLUTION RESPIRATORY (INHALATION) ONCE
Status: COMPLETED | OUTPATIENT
Start: 2018-07-21 | End: 2018-07-21

## 2018-07-21 RX ORDER — METHYLPREDNISOLONE SODIUM SUCCINATE 125 MG/2ML
80 INJECTION, POWDER, LYOPHILIZED, FOR SOLUTION INTRAMUSCULAR; INTRAVENOUS ONCE
Status: COMPLETED | OUTPATIENT
Start: 2018-07-21 | End: 2018-07-21

## 2018-07-21 RX ADMIN — METHYLPREDNISOLONE SODIUM SUCCINATE 80 MG: 125 INJECTION, POWDER, FOR SOLUTION INTRAMUSCULAR; INTRAVENOUS at 09:18

## 2018-07-21 RX ADMIN — IPRATROPIUM BROMIDE AND ALBUTEROL SULFATE 1 AMPULE: .5; 3 SOLUTION RESPIRATORY (INHALATION) at 09:23

## 2018-07-21 ASSESSMENT — ENCOUNTER SYMPTOMS
CHEST TIGHTNESS: 0
NAUSEA: 0
COUGH: 1
SORE THROAT: 1
WHEEZING: 1
DIARRHEA: 0
SINUS PRESSURE: 0
ABDOMINAL PAIN: 0
RHINORRHEA: 0
VOMITING: 0
SHORTNESS OF BREATH: 1

## 2018-07-21 NOTE — ED TRIAGE NOTES
Patient brought to  7 by wheelchair, weak, SOB, at front window. 5 days cough, wheezing, SOB, seen PCP 3 days ago,meds given, c/o feeling worse  Today, bilat rib pain, chest congestion, harsh cough,sore throat, EKG done, patient won't cough, due to rib pain in ribs. duo- neb started tolerating well,patient to x- ray by wheelchair.

## 2018-07-21 NOTE — ED PROVIDER NOTES
[facility]  Urgent Care Encounter      CHIEF COMPLAINT       Chief Complaint   Patient presents with    Cough     wheezing, SOB    Pharyngitis    Rib Pain     bilat. Nurses Notes reviewed and I agree except as noted in the HPI. HISTORY OF PRESENT ILLNESS   Berenice Enamorado is a 59 y.o. female who presents for evaluation of shortness of breath, wheezing, bilateral rib rib pain from coughing, and dry cough that has been present since 18. She was seen by her PCP on 18 for similar symptoms. She was prescribed prednisone, amoxicillin, and Tessalon Perles. She has been taking these faithfully with worsening of symptoms. She has had pneumonia in the past and is afraid she may have it again. Current tobacco smoker. No history of PE or DVT. Identifiable risk factors are smoking and obesity. She does not use home oxygen or CPAP. REVIEW OF SYSTEMS     Review of Systems   Constitutional: Positive for fever. Negative for chills and fatigue. HENT: Positive for sore throat. Negative for congestion, ear pain, postnasal drip, rhinorrhea and sinus pressure. Respiratory: Positive for cough, shortness of breath and wheezing. Negative for chest tightness. Cardiovascular: Negative for chest pain. Gastrointestinal: Negative for abdominal pain, diarrhea, nausea and vomiting. Skin: Negative for rash. Allergic/Immunologic: Negative for environmental allergies and food allergies. Neurological: Negative for headaches. PAST MEDICAL HISTORY         Diagnosis Date    Hyperlipidemia 2016    IFG (impaired fasting glucose) 2016    Morbid obesity ([address] 3/11/2015       SURGICAL HISTORY     Patient  has a past surgical history that includes Rotator cuff repair ();  section (, ); Hysterectomy (~); and Septoplasty (2016).     CURRENT MEDICATIONS       Previous Medications    AMOXICILLIN (AMOXIL) 500 MG CAPSULE    Take 1 capsule by mouth 2 times daily for 10 days    BENZONATATE (TESSALON PERLES) 100 MG CAPSULE    Take 1 capsule by mouth 3 times daily as needed for Cough    DEXTROMETHORPHAN-GUAIFENESIN (ROBITUSSIN DM PO)    Take by mouth    IBUPROFEN (ADVIL;MOTRIN) 200 MG TABLET    Take 200 mg by mouth every 6 hours as needed for Pain Taking 3 tabs    PREDNISONE (DELTASONE) 20 MG TABLET    Take 1 tablet by mouth 2 times daily for 5 days       ALLERGIES     Patient is has No Known Allergies. FAMILY HISTORY     Patient's family history includes Cancer in her father and mother; Diabetes in her father; Heart Disease in her maternal grandfather, maternal grandmother, paternal grandfather, and paternal grandmother; High Blood Pressure in her maternal grandfather, maternal grandmother, paternal grandfather, and paternal grandmother. SOCIAL HISTORY     Patient  reports that she has been smoking Cigarettes. She has a 17.50 pack-year smoking history. She has never used smokeless tobacco. She reports that she drinks alcohol. She reports that she does not use drugs. PHYSICAL EXAM     ED TRIAGE VITALS  BP: 135/85, Temp: 99.1 °F (37.3 °C), Pulse: 76, Resp: 22 (coaching patinet to slow down breathing.), SpO2: 93 % (93%-95%)  Physical Exam   Constitutional: She is oriented to person, place, and time. Vital signs are normal. She appears well-developed and well-nourished. She is cooperative. She appears ill. No distress. HENT:   Head: Normocephalic and atraumatic. Right Ear: External ear normal.   Left Ear: External ear normal.   Nose: Nose normal.   Mouth/Throat: Mucous membranes are normal.   Eyes: Conjunctivae are normal.   Neck: Normal range of motion and full passive range of motion without pain. No neck rigidity. Cardiovascular: Normal rate. Pulmonary/Chest: Accessory muscle usage present. No respiratory distress. She has decreased breath sounds (throughout). She has wheezes (scattered expiratory throughout).    Neurological: She is alert and oriented to person, place, and time. Skin: Skin is warm and dry. No rash (on exposed surfaces) noted. She is not diaphoretic. Psychiatric: She has a normal mood and affect. Her speech is normal.   Nursing note and vitals reviewed. DIAGNOSTIC RESULTS   Labs:  Results for orders placed or performed during the hospital encounter of 07/21/18   EKG 12 Lead   Result Value Ref Range    Ventricular Rate 73 BPM    Atrial Rate 73 BPM    P-R Interval 152 ms    QRS Duration 74 ms    Q-T Interval 380 ms    QTc Calculation (Bazett) 418 ms    P Axis 58 degrees    R Axis 30 degrees    T Axis 40 degrees       IMAGING:  XR CHEST STANDARD (2 VW)   Final Result      No acute intrathoracic process. **This report has been created using voice recognition software. It may contain minor errors which are inherent in voice recognition technology. **      Final report electronically signed by Dr. Zachary Wilson on 7/21/2018 9:41 AM        URGENT CARE COURSE:     Vitals:    07/21/18 0932   BP: 135/85   Pulse: 76   Resp: 22   Temp: 99.1 °F (37.3 °C)   TempSrc: Oral   SpO2: 93%   Weight: 246 lb (111.6 kg)       Medications   ipratropium-albuterol (DUONEB) nebulizer solution 1 ampule (1 ampule Inhalation Given 7/21/18 0923)   methylPREDNISolone sodium (SOLU-MEDROL) injection 80 mg (80 mg Intramuscular Given 7/21/18 0918)     PROCEDURES:  None  FINAL IMPRESSION      1. Shortness of breath    2. Wheezing    3. Tobacco use    4. Cough        DISPOSITION/PLAN   DISPOSITION    AMA   No improvement Solu-Medrol and DuoNeb. Patient appears uncomfortable and she agrees that she is. She is  is using accessory muscles, and she is slightly hypoxic, wheezing, and decreased lung sounds continue. Patient is so uncomfortable and symptomatic that she presents in wheelchair and had to be wheeled from exam room to x-ray room and back. She does not use home oxygen or CPAP.   Chest x-ray is negative for any acute process but patient could benefit from CT

## 2018-07-23 ENCOUNTER — TELEPHONE (OUTPATIENT)
Dept: FAMILY MEDICINE CLINIC | Age: 58
End: 2018-07-23

## 2018-07-23 NOTE — TELEPHONE ENCOUNTER
Spoke with patient. She is having severe left rib pain during coughing spells. Her cough is harsh and deep. States that the hospital changed her ATB to Levaquin on 7/21/18. She is also on prednisone and 800 mg ibuprofen every 8 hours. She was using [employer] initially but it did nothing for her. I have scheduled an appt with TM tomorrow morning but patient/son would like to know if there is anything else that can be done in the interim.

## 2018-07-24 ENCOUNTER — OFFICE VISIT (OUTPATIENT)
Dept: FAMILY MEDICINE CLINIC | Age: 58
End: 2018-07-24
Payer: COMMERCIAL

## 2018-07-24 VITALS
OXYGEN SATURATION: 90 % | SYSTOLIC BLOOD PRESSURE: 116 MMHG | RESPIRATION RATE: 92 BRPM | HEART RATE: 76 BPM | DIASTOLIC BLOOD PRESSURE: 80 MMHG | TEMPERATURE: 98.1 F

## 2018-07-24 DIAGNOSIS — J44.9 CHRONIC OBSTRUCTIVE PULMONARY DISEASE, UNSPECIFIED COPD TYPE (HCC): ICD-10-CM

## 2018-07-24 DIAGNOSIS — J18.9 PNEUMONIA OF RIGHT LUNG DUE TO INFECTIOUS ORGANISM, UNSPECIFIED PART OF LUNG: Primary | ICD-10-CM

## 2018-07-24 DIAGNOSIS — R06.2 WHEEZING: ICD-10-CM

## 2018-07-24 PROCEDURE — 99214 OFFICE O/P EST MOD 30 MIN: CPT | Performed by: NURSE PRACTITIONER

## 2018-07-24 RX ORDER — GUAIFENESIN AND CODEINE PHOSPHATE 100; 10 MG/5ML; MG/5ML
5 SOLUTION ORAL 3 TIMES DAILY PRN
Qty: 60 ML | Refills: 0 | Status: SHIPPED | OUTPATIENT
Start: 2018-07-24 | End: 2018-07-28

## 2018-07-24 RX ORDER — LEVOFLOXACIN 5 MG/ML
500 INJECTION, SOLUTION INTRAVENOUS
COMMUNITY
End: 2020-11-17

## 2018-07-24 RX ORDER — IPRATROPIUM BROMIDE AND ALBUTEROL SULFATE 2.5; .5 MG/3ML; MG/3ML
1 SOLUTION RESPIRATORY (INHALATION) EVERY 6 HOURS
Qty: 360 ML | Refills: 0 | Status: SHIPPED | OUTPATIENT
Start: 2018-07-24 | End: 2020-11-17

## 2018-07-24 ASSESSMENT — ENCOUNTER SYMPTOMS
DIARRHEA: 0
COUGH: 1
ABDOMINAL PAIN: 0
SHORTNESS OF BREATH: 1
CHEST TIGHTNESS: 1
SORE THROAT: 0
WHEEZING: 1
CONSTIPATION: 0
NAUSEA: 0

## 2018-07-24 NOTE — PATIENT INSTRUCTIONS
Go to the emergency department if your symptoms worsen in the next 48 hours. Patient Education        Pneumonia: Care Instructions  Your Care Instructions    Pneumonia is an infection of the lungs. Most cases are caused by infections from bacteria or viruses. Pneumonia may be mild or very severe. If it is caused by bacteria, you will be treated with antibiotics. It may take a few weeks to a few months to recover fully from pneumonia, depending on how sick you were and whether your overall health is good. Follow-up care is a key part of your treatment and safety. Be sure to make and go to all appointments, and call your doctor if you are having problems. It's also a good idea to know your test results and keep a list of the medicines you take. How can you care for yourself at home? · Take your antibiotics exactly as directed. Do not stop taking the medicine just because you are feeling better. You need to take the full course of antibiotics. · Take your medicines exactly as prescribed. Call your doctor if you think you are having a problem with your medicine. · Get plenty of rest and sleep. You may feel weak and tired for a while, but your energy level will improve with time. · To prevent dehydration, drink plenty of fluids, enough so that your urine is light yellow or clear like water. Choose water and other caffeine-free clear liquids until you feel better. If you have kidney, heart, or liver disease and have to limit fluids, talk with your doctor before you increase the amount of fluids you drink. · Take care of your cough so you can rest. A cough that brings up mucus from your lungs is common with pneumonia. It is one way your body gets rid of the infection. But if coughing keeps you from resting or causes severe fatigue and chest-wall pain, talk to your doctor. He or she may suggest that you take a medicine to reduce the cough. · Use a vaporizer or humidifier to add moisture to your bedroom.  Follow the directions for cleaning the machine. · Do not smoke or allow others to smoke around you. Smoke will make your cough last longer. If you need help quitting, talk to your doctor about stop-smoking programs and medicines. These can increase your chances of quitting for good. · Take an over-the-counter pain medicine, such as acetaminophen (Tylenol), ibuprofen (Advil, Motrin), or naproxen (Aleve). Read and follow all instructions on the label. · Do not take two or more pain medicines at the same time unless the doctor told you to. Many pain medicines have acetaminophen, which is Tylenol. Too much acetaminophen (Tylenol) can be harmful. · If you were given a spirometer to measure how well your lungs are working, use it as instructed. This can help your doctor tell how your recovery is going. · To prevent pneumonia in the future, talk to your doctor about getting a flu vaccine (once a year) and a pneumococcal vaccine (one time only for most people). When should you call for help? Call 911 anytime you think you may need emergency care. For example, call if:    · You have severe trouble breathing.    Call your doctor now or seek immediate medical care if:    · You cough up dark brown or bloody mucus (sputum).     · You have new or worse trouble breathing.     · You are dizzy or lightheaded, or you feel like you may faint.    Watch closely for changes in your health, and be sure to contact your doctor if:    · You have a new or higher fever.     · You are coughing more deeply or more often.     · You are not getting better after 2 days (48 hours).     · You do not get better as expected. Where can you learn more? Go to https://ParastructurepeMyLikes.Powerit Solutions. org and sign in to your [email or web address] account. Enter D336 in the [employer] box to learn more about \"Pneumonia: Care Instructions. \"     If you do not have an account, please click on the \"Sign Up Now\" link.   Current as of: December 6, 2017  Content Version: antibiotics. · Breathe moist air from a humidifier, hot shower, or sink filled with hot water. This may help ease your symptoms and make it easier for you to breathe. · If you have congestion in your nose and throat, drinking plenty of fluids, especially hot fluids, may help relieve your symptoms. If you have kidney, heart, or liver disease and have to limit fluids, talk with your doctor before you increase the amount of fluids you drink. · If you have mucus in your airways, it may help to breathe deeply and cough. · Do not smoke or allow others to smoke around you. Smoking can make your wheezing worse. If you need help quitting, talk to your doctor about stop-smoking programs and medicines. These can increase your chances of quitting for good. · Avoid things that may cause your wheezing. These may include colds, smoke, air pollution, dust, pollen, pets, cockroaches, stress, and cold air. When should you call for help? Call 911 anytime you think you may need emergency care. For example, call if:    · You have severe trouble breathing.     · You passed out (lost consciousness).    Call your doctor now or seek immediate medical care if:    · You cough up yellow, dark brown, or bloody mucus (sputum).     · You have new or worse shortness of breath.     · Your wheezing is not getting better or it gets worse after you start taking your medicine.    Watch closely for changes in your health, and be sure to contact your doctor if:    · You do not get better as expected. Where can you learn more? Go to https://LSEOlaurel.SocialDial. org and sign in to your B[employer] Motion account. Enter 685 [id number] in the [facility] box to learn more about \"Wheezing or Bronchoconstriction: Care Instructions. \"     If you do not have an account, please click on the \"Sign Up Now\" link. Current as of: December 6, 2017  Content Version: 11.6  © 0608-5659 [employer].  Care instructions adapted under license by [address] [employer]. Be sure to make and go to all appointments, and call your doctor if you are having problems. It's also a good idea to know your test results and keep a list of the medicines you take. Where can you learn more? Go to https://chlaurel.Anytime DD. org and sign in to your Pocket account. Enter N426 in the [employer] box to learn more about \"Learning About COPD and How to Prevent Lung Infections. \"     If you do not have an account, please click on the \"Sign Up Now\" link. Current as of: December 6, 2017  Content Version: 11.6  © 9370-2010 [employer]. Care instructions adapted under license by [facility] ([facility]). If you have questions about a medical condition or this instruction, always ask your healthcare professional. [address] any warranty or liability for your use of this information.

## 2018-07-24 NOTE — PROGRESS NOTES
(TUSSI-ORGANIDIN NR) 100-10 MG/5ML syrup, Take 5 mLs by mouth 3 times daily as needed for Cough for up to 4 days. ., Disp: 60 mL, Rfl: 0    predniSONE (DELTASONE) 20 MG tablet, Take 1 tablet by mouth 2 times daily for 5 days, Disp: 10 tablet, Rfl: 0    Dextromethorphan-Guaifenesin (ROBITUSSIN DM PO), Take by mouth, Disp: , Rfl:     benzonatate (TESSALON PERLES) 100 MG capsule, Take 1 capsule by mouth 3 times daily as needed for Cough, Disp: 21 capsule, Rfl: 0    ibuprofen (ADVIL;MOTRIN) 200 MG tablet, Take 200 mg by mouth every 6 hours as needed for Pain Taking 3 tabs, Disp: , Rfl:     The patient has No Known Allergies. Past Medical History  Staci Wallis  has a past medical history of Hyperlipidemia; IFG (impaired fasting glucose); and Morbid obesity ([address] Subjective:      Review of Systems   Constitutional: Positive for fatigue. Negative for appetite change, diaphoresis and fever. HENT: Negative for congestion, sore throat and tinnitus. Eyes: Negative for visual disturbance. Respiratory: Positive for cough, chest tightness, shortness of breath and wheezing. Cardiovascular: Positive for chest pain. Negative for leg swelling. Gastrointestinal: Negative for abdominal pain, constipation, diarrhea and nausea. Genitourinary: Negative for frequency. Musculoskeletal: Negative for arthralgias, myalgias and neck stiffness. Skin: Negative for rash. Neurological: Negative for dizziness, weakness and headaches. Psychiatric/Behavioral: The patient is not nervous/anxious. All other systems reviewed and are negative. Objective:     /80   Pulse 76   Temp 98.1 °F (36.7 °C) (Oral)   Resp (!) 92   SpO2 90%     Physical Exam   Constitutional: She is oriented to person, place, and time. She appears well-developed and well-nourished. She is cooperative. She appears ill. No distress.    HENT:   Right Ear: Hearing, tympanic membrane, external ear and ear canal normal.   Left Ear: Hearing, tympanic membrane, external ear and ear canal normal.   Nose: Nose normal. No mucosal edema or rhinorrhea. Mouth/Throat: Uvula is midline, oropharynx is clear and moist and mucous membranes are normal. No uvula swelling. No oropharyngeal exudate, posterior oropharyngeal edema or posterior oropharyngeal erythema. Eyes: Conjunctivae, EOM and lids are normal. Pupils are equal, round, and reactive to light. Right eye exhibits no discharge. Left eye exhibits no discharge. Neck: Full passive range of motion without pain. No muscular tenderness present. Cardiovascular: Normal rate, regular rhythm and normal heart sounds. Pulmonary/Chest: Accessory muscle usage present. No respiratory distress. She has wheezes in the right upper field, the right middle field, the right lower field, the left upper field, the left middle field and the left lower field. She has no rales. Lymphadenopathy:     She has no cervical adenopathy. Neurological: She is alert and oriented to person, place, and time. She has normal strength. No cranial nerve deficit. Coordination and gait normal. GCS eye subscore is 4. GCS verbal subscore is 5. GCS motor subscore is 6. Skin: Skin is warm and dry. She is not diaphoretic. Psychiatric: She has a normal mood and affect. Her speech is normal and behavior is normal. Judgment and thought content normal. Cognition and memory are normal.   Nursing note and vitals reviewed. Assessment/Plan:      Chaka Santoyo was seen today for cough, chest pain and shortness of breath. She has a significant coughing episode when first entering the room for her examination. She does eventually settle and is able to converse. She has extensive expiratory wheezing throughout all lung fields, and it is difficult to hear if she has any rales. She states she was told she has right sided pneumonia. She appears quite tired but nontoxic, is afebrile. Pulse ox 92%. She is not using any kind of inhalers.   She smells very heavily of cigarette smoke and has obviously smoked prior to coming to the office. TMs and posterior oropharynx are normal and she has no cervical adenopathy. She has been taking her medication as prescribed, not using any kind of cough medicine. Discussed with the patient at length that she is teeterin on a hospital admission. Also challenged her about her smoking and that she likely has COPDwhich she states she has never been told. After discussion with her and her son, it was agreed that we would prescribe an aerosol machine for home breathing treatments, and Virtussin for cough control. Patient is in agreement with this as she would prefer not to go back to the emergency department at this point. They're given strict instructions that if her situation deteriorates at all, she is go directly to the emergency department, calling EMS if necessary. Diagnoses and all orders for this visit:    Pneumonia of right lung due to infectious organism, unspecified part of lung  -     DME Order for Nebulizer as OP  -     ipratropium-albuterol (DUONEB) 0.5-2.5 (3) MG/3ML SOLN nebulizer solution; Take 3 mLs by nebulization every 6 hours  -     guaiFENesin-codeine (TUSSI-ORGANIDIN NR) 100-10 MG/5ML syrup; Take 5 mLs by mouth 3 times daily as needed for Cough for up to 4 days. .    Wheezing  -     ipratropium-albuterol (DUONEB) 0.5-2.5 (3) MG/3ML SOLN nebulizer solution; Take 3 mLs by nebulization every 6 hours      Return if symptoms worsen or fail to improve. Patient instructions given and reviewed. At least 30 minutes was spent with the patient in evaluation, consultation and decision making.      Electronically signed by NIKI Caldwell CNP on 7/24/2018 at 9:09 AM

## 2020-11-17 ENCOUNTER — HOSPITAL ENCOUNTER (EMERGENCY)
Age: 60
Discharge: HOME | End: 2020-11-17
Payer: COMMERCIAL

## 2020-11-17 VITALS
RESPIRATION RATE: 18 BRPM | TEMPERATURE: 97.4 F | HEIGHT: 61 IN | HEART RATE: 98 BPM | BODY MASS INDEX: 49.09 KG/M2 | SYSTOLIC BLOOD PRESSURE: 143 MMHG | WEIGHT: 260 LBS | OXYGEN SATURATION: 96 % | DIASTOLIC BLOOD PRESSURE: 74 MMHG

## 2020-11-17 PROCEDURE — 99213 OFFICE O/P EST LOW 20 MIN: CPT

## 2020-11-17 PROCEDURE — 99214 OFFICE O/P EST MOD 30 MIN: CPT | Performed by: NURSE PRACTITIONER

## 2020-11-17 RX ORDER — AMOXICILLIN AND CLAVULANATE POTASSIUM 875; 125 MG/1; MG/1
1 TABLET, FILM COATED ORAL 2 TIMES DAILY
Qty: 20 TABLET | Refills: 0 | Status: SHIPPED | OUTPATIENT
Start: 2020-11-17 | End: 2020-11-27

## 2020-11-17 ASSESSMENT — ENCOUNTER SYMPTOMS
EYE DISCHARGE: 0
SHORTNESS OF BREATH: 0
SORE THROAT: 0
EYE REDNESS: 0
TROUBLE SWALLOWING: 0
RHINORRHEA: 1
SINUS PAIN: 1
SINUS PRESSURE: 1
COUGH: 0
NAUSEA: 0
DIARRHEA: 0
VOMITING: 0

## 2020-11-17 NOTE — ED PROVIDER NOTES
Medications    IBUPROFEN (ADVIL;MOTRIN) 200 MG TABLET    Take 200 mg by mouth every 6 hours as needed for Pain Taking 3 tabs       ALLERGIES     Patient is has No Known Allergies. FAMILY HISTORY     Patient'sfamily history includes Cancer in her father and mother; Diabetes in her father; Heart Disease in her maternal grandfather, maternal grandmother, paternal grandfather, and paternal grandmother; High Blood Pressure in her maternal grandfather, maternal grandmother, paternal grandfather, and paternal grandmother. SOCIAL HISTORY     Patient  reports that she has been smoking cigarettes. She has a 17.50 pack-year smoking history. She has never used smokeless tobacco. She reports current alcohol use. She reports that she does not use drugs. PHYSICAL EXAM     ED TRIAGE VITALS  BP: (!) 143/74, Temp: 97.4 °F (36.3 °C), Pulse: 98, Resp: 18, SpO2: 96 %  Physical Exam  Vitals signs and nursing note reviewed. Constitutional:       General: She is not in acute distress. Appearance: Normal appearance. She is well-developed. She is not ill-appearing, toxic-appearing or diaphoretic. HENT:      Head: Normocephalic and atraumatic. Right Ear: Hearing, tympanic membrane, ear canal and external ear normal. No mastoid tenderness. No hemotympanum. Tympanic membrane is not perforated, erythematous or bulging. Left Ear: Hearing, tympanic membrane, ear canal and external ear normal. No mastoid tenderness. No hemotympanum. Tympanic membrane is not perforated, erythematous or bulging. Nose: Mucosal edema and congestion present. No rhinorrhea. Right Sinus: Maxillary sinus tenderness present. No frontal sinus tenderness. Left Sinus: Maxillary sinus tenderness present. No frontal sinus tenderness. Mouth/Throat:      Mouth: Mucous membranes are moist.      Pharynx: Oropharynx is clear. Uvula midline. Tonsils: No tonsillar abscesses. Eyes:      General: No scleral icterus. Conjunctiva/sclera: Conjunctivae normal.      Right eye: Right conjunctiva is not injected. No hemorrhage. Left eye: Left conjunctiva is not injected. No hemorrhage. Neck:      Musculoskeletal: Normal range of motion and neck supple. Thyroid: No thyromegaly. Trachea: Trachea normal.   Cardiovascular:      Rate and Rhythm: Normal rate and regular rhythm. No extrasystoles are present. Chest Wall: PMI is not displaced. Heart sounds: Normal heart sounds. No murmur. No friction rub. No gallop. Pulmonary:      Effort: Pulmonary effort is normal. No respiratory distress. Breath sounds: Normal breath sounds. Lymphadenopathy:      Head:      Right side of head: No submental, submandibular, tonsillar or occipital adenopathy. Left side of head: No submental, submandibular, tonsillar or occipital adenopathy. Cervical: No cervical adenopathy. Upper Body:      Right upper body: No supraclavicular adenopathy. Left upper body: No supraclavicular adenopathy. Skin:     General: Skin is warm and dry. Capillary Refill: Capillary refill takes less than 2 seconds. Coloration: Skin is not pale. Findings: No rash. Comments: Skin warm and dry to touch, no rashes noted on exposed surfaces. Neurological:      Mental Status: She is alert and oriented to person, place, and time. She is not disoriented. Psychiatric:         Mood and Affect: Mood normal.         Behavior: Behavior is cooperative. DIAGNOSTIC RESULTS   Labs: No results found for this visit on 11/17/20. IMAGING:  No orders to display     URGENT CARE COURSE:     Vitals:    11/17/20 1133   BP: (!) 143/74   Pulse: 98   Resp: 18   Temp: 97.4 °F (36.3 °C)   TempSrc: Temporal   SpO2: 96%   Weight: 260 lb (117.9 kg)   Height: 5' 1\" (1.549 m)       Medications - No data to display  PROCEDURES:  None  FINALIMPRESSION      1. Acute non-recurrent maxillary sinusitis    2. Loss of smell    3.  Loss of taste    4. Close exposure to COVID-19 virus        DISPOSITION/PLAN   DISPOSITION Decision To Discharge 11/17/2020 12:02:02 PM  Nontoxic, no acute distress. Treat for acute sinusitis. Patient has Covid results pending that should be resulted tomorrow. Loss of taste and smell with close exposure to Covid. Advised to self isolate. Continue current medicine. Increase fluids. Sinus rinse twice daily. If worsening go to ER. PATIENT REFERRED TO:  Marisa Lara MD  [facility]  [phone]    In 1 week  Follow up as needed. Medication as prescribed, continue current medication. SInus rinse 2x/day.   If worse go to ER>    DISCHARGE MEDICATIONS:  New Prescriptions    AMOXICILLIN-CLAVULANATE (AUGMENTIN) 875-125 MG PER TABLET    Take 1 tablet by mouth 2 times daily for 10 days     Current Discharge Medication List          NIKI De Leon CNP, APRN - CNP  11/17/20 8114

## 2020-11-17 NOTE — ED NOTES
To [facility] with complaints on possible sinus infection. Symptoms started about a week ago. States headache, nasal congestion, loss of taste and smell. 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 Pt had 2 negative covids last week.       Rick Jean RN  11/17/20 1901

## 2021-07-31 ENCOUNTER — HOSPITAL ENCOUNTER (EMERGENCY)
Age: 61
Discharge: HOME | End: 2021-07-31
Payer: COMMERCIAL

## 2021-07-31 VITALS
OXYGEN SATURATION: 95 % | SYSTOLIC BLOOD PRESSURE: 130 MMHG | DIASTOLIC BLOOD PRESSURE: 74 MMHG | RESPIRATION RATE: 16 BRPM | HEART RATE: 98 BPM | TEMPERATURE: 98.3 F

## 2021-07-31 DIAGNOSIS — H65.01 RIGHT ACUTE SEROUS OTITIS MEDIA, RECURRENCE NOT SPECIFIED: ICD-10-CM

## 2021-07-31 DIAGNOSIS — J06.9 ACUTE UPPER RESPIRATORY INFECTION: ICD-10-CM

## 2021-07-31 DIAGNOSIS — J02.9 ACUTE PHARYNGITIS, UNSPECIFIED ETIOLOGY: Primary | ICD-10-CM

## 2021-07-31 PROCEDURE — 99213 OFFICE O/P EST LOW 20 MIN: CPT

## 2021-07-31 PROCEDURE — 99214 OFFICE O/P EST MOD 30 MIN: CPT | Performed by: NURSE PRACTITIONER

## 2021-07-31 RX ORDER — BENZOCAINE AND MENTHOL 15; 2.6 MG/1; MG/1
1 LOZENGE ORAL
Qty: 30 LOZENGE | Refills: 0 | Status: SHIPPED | OUTPATIENT
Start: 2021-07-31 | End: 2021-08-07

## 2021-07-31 RX ORDER — ALBUTEROL SULFATE 2.5 MG/3ML
SOLUTION RESPIRATORY (INHALATION)
COMMUNITY
Start: 2021-06-22 | End: 2021-12-30 | Stop reason: SDUPTHER

## 2021-07-31 ASSESSMENT — ENCOUNTER SYMPTOMS
SORE THROAT: 1
SHORTNESS OF BREATH: 0
COUGH: 1

## 2021-07-31 ASSESSMENT — PAIN SCALES - GENERAL: PAINLEVEL_OUTOF10: 8

## 2021-07-31 ASSESSMENT — PAIN DESCRIPTION - LOCATION: LOCATION: THROAT;EAR

## 2021-07-31 NOTE — ED PROVIDER NOTES
[facility]  Urgent Care Encounter       CHIEF COMPLAINT       Chief Complaint   Patient presents with    Nasal Congestion    Otalgia    Pharyngitis    Cough       Nurses Notes reviewed and I agree except as noted in the HPI. HISTORY OF PRESENT ILLNESS   Dawson Gomez is a 56 y.o. female who presents with complaints of congestion, ear pressure, severe sore throat, and cough. Patient admits to smoking. She gets Covid tested twice weekly which she has been negative. States her sore throat started on Wednesday and is worsened since that time. Continues to smoke. States son had similar symptoms last week. Denies any fever or chills. She denies any shortness of breath. Has not tried any treatment. REVIEW OF SYSTEMS     Review of Systems   Constitutional: Negative for chills and fever. HENT: Positive for congestion, ear pain and sore throat. Respiratory: Positive for cough. Negative for shortness of breath. Cardiovascular: Negative for chest pain. Musculoskeletal: Negative for myalgias. Neurological: Negative for headaches. PAST MEDICAL HISTORY         Diagnosis Date    Hyperlipidemia 2016    IFG (impaired fasting glucose) 2016    Morbid obesity ([facility] [address] 3/11/2015       SURGICALHISTORY     Patient  has a past surgical history that includes Rotator cuff repair ();  section (, ); Hysterectomy (~); and Septoplasty (2016). CURRENT MEDICATIONS       Previous Medications    ALBUTEROL (PROVENTIL) (2.5 MG/3ML) 0.083% NEBULIZER SOLUTION    USE 1 VIAL IN NEBULIZER THREE TIMES DAILY AS NEEDED    IBUPROFEN (ADVIL;MOTRIN) 200 MG TABLET    Take 200 mg by mouth every 6 hours as needed for Pain Taking 3 tabs       ALLERGIES     Patient is has No Known Allergies. Patients   There is no immunization history on file for this patient.     FAMILY HISTORY     Patient's family history includes Cancer in her father and mother; Diabetes in her father; Heart Disease in her maternal grandfather, maternal grandmother, paternal grandfather, and paternal grandmother; High Blood Pressure in her maternal grandfather, maternal grandmother, paternal grandfather, and paternal grandmother. SOCIAL HISTORY     Patient  reports that she has been smoking cigarettes. She has a 17.50 pack-year smoking history. She has never used smokeless tobacco. She reports current alcohol use. She reports that she does not use drugs. PHYSICAL EXAM     ED TRIAGE VITALS  BP: 130/74, Temp: 98.3 °F (36.8 °C), Pulse: 98, Resp: 16, SpO2: 95 %,Estimated body mass index is 49.13 kg/m² as calculated from the following:    Height as of 11/17/20: 5' 1\" (1.549 m). Weight as of 11/17/20: 260 lb (117.9 kg). ,No LMP recorded. Patient has had a hysterectomy. Physical Exam  Vitals and nursing note reviewed. Constitutional:       General: She is not in acute distress. HENT:      Right Ear: Tympanic membrane and ear canal normal. Tympanic membrane is not erythematous. Left Ear: Tympanic membrane and ear canal normal. Tympanic membrane is not erythematous. Nose: Congestion present. No rhinorrhea. Mouth/Throat:      Mouth: Mucous membranes are moist.      Pharynx: Posterior oropharyngeal erythema present. No pharyngeal swelling or oropharyngeal exudate. Tonsils: No tonsillar exudate. 0 on the right. 0 on the left. Cardiovascular:      Rate and Rhythm: Normal rate and regular rhythm. Heart sounds: Normal heart sounds. Pulmonary:      Effort: No respiratory distress. Breath sounds: No wheezing, rhonchi or rales. Lymphadenopathy:      Cervical: No cervical adenopathy. Skin:     General: Skin is warm and dry. Neurological:      Mental Status: She is alert and oriented to person, place, and time. DIAGNOSTIC RESULTS     Labs:No results found for this visit on 07/31/21.     IMAGING:  None    EKG:  None    URGENT CARE COURSE:     Vitals:    07/31/21 1652 BP: 130/74   Pulse: 98   Resp: 16   Temp: 98.3 °F (36.8 °C)   TempSrc: Temporal   SpO2: 95%       Medications - No data to display       PROCEDURES:  None    FINAL IMPRESSION      1. Acute pharyngitis, unspecified etiology    2. Acute upper respiratory infection    3. Smoking      DISPOSITION/ PLAN   DISPOSITION Decision To Discharge 07/31/2021 05:07:29 PM     Sore throat likely result of acute respiratory infection as well as continued smoking. Recommend controlling symptoms with Cepacol lozenges and over-the-counter antipyretics as necessary. Discussed length of viral illnesses of 10 to 14 days typically. Patient advised to follow-up with PCP if worsens or fails to improve. Advised to quit smoking discussed health benefits as well as printed material.  Patient voiced understanding. Patient was discharged in stable condition.     PATIENT REFERRED TO:  Maco Banks MD  [address] [address]      DISCHARGE MEDICATIONS:  New Prescriptions    BENZOCAINE-MENTHOL (CEPACOL EXTRA STRENGTH) 15-2.6 MG LOZG LOZENGE    Take 1 lozenge by mouth every 2 hours as needed for Sore Throat       Discontinued Medications    No medications on file       Current Discharge Medication List          NIKI Lawrence CNP    (Please note that portions of this note were completed with a voice recognition program. Efforts were made to edit the dictations but occasionally words are mis-transcribed.)           NIKI Lawrence CNP  07/31/21 3135

## 2021-08-02 ENCOUNTER — TELEPHONE (OUTPATIENT)
Dept: FAMILY MEDICINE CLINIC | Age: 61
End: 2021-08-02

## 2021-08-02 NOTE — LETTER
[address]  [address] [facility], [address]  Phone: [phone]  Fax: [phone]    August 2, 2021    [address] [place] [id number]    Dear Coreen Fonseca,    Thank you for choosing our [facility] on 7/31/21. Dr. Tony Marquez wanted to make sure that you understand your discharge instructions and that you were able to fill any prescriptions that may have been ordered for you. Please contact the office at the above phone number if you were advised to follow up with Dr. Tony Marquez, or if you have any further questions or needs. Also, did you know -                             [facility] ([facility]) practices can often offer you an appointment on the same day that you call for acute issues. *We have some [facility] offices that offer Walk-in appointments; check our website for availability in your community, www. [employer].      *Evisits are now available for patients through [address] [facility]) also offers video visits through [address] If you do not have MyChart and are interested, please contact the office and a staff member may assist you or go to [email or web address].     Sincerely,   Tony Marquez MD and your [facility]

## 2021-09-10 ENCOUNTER — OFFICE VISIT (OUTPATIENT)
Dept: FAMILY MEDICINE CLINIC | Age: 61
End: 2021-09-10
Payer: COMMERCIAL

## 2021-09-10 VITALS
WEIGHT: 257 LBS | SYSTOLIC BLOOD PRESSURE: 122 MMHG | HEIGHT: 61 IN | RESPIRATION RATE: 14 BRPM | BODY MASS INDEX: 48.52 KG/M2 | DIASTOLIC BLOOD PRESSURE: 74 MMHG | HEART RATE: 92 BPM

## 2021-09-10 DIAGNOSIS — E66.01 MORBID OBESITY (HCC): ICD-10-CM

## 2021-09-10 DIAGNOSIS — G89.29 CHRONIC BILATERAL LOW BACK PAIN WITHOUT SCIATICA: Primary | ICD-10-CM

## 2021-09-10 DIAGNOSIS — R73.01 IFG (IMPAIRED FASTING GLUCOSE): ICD-10-CM

## 2021-09-10 DIAGNOSIS — M54.50 CHRONIC BILATERAL LOW BACK PAIN WITHOUT SCIATICA: Primary | ICD-10-CM

## 2021-09-10 DIAGNOSIS — E78.5 HYPERLIPIDEMIA, UNSPECIFIED HYPERLIPIDEMIA TYPE: ICD-10-CM

## 2021-09-10 PROCEDURE — 99203 OFFICE O/P NEW LOW 30 MIN: CPT | Performed by: NURSE PRACTITIONER

## 2021-09-10 RX ORDER — TIZANIDINE 4 MG/1
4 TABLET ORAL EVERY 8 HOURS PRN
Qty: 30 TABLET | Refills: 0 | Status: SHIPPED | OUTPATIENT
Start: 2021-09-10

## 2021-09-10 ASSESSMENT — PATIENT HEALTH QUESTIONNAIRE - PHQ9
1. LITTLE INTEREST OR PLEASURE IN DOING THINGS: 0
SUM OF ALL RESPONSES TO PHQ QUESTIONS 1-9: 0
SUM OF ALL RESPONSES TO PHQ9 QUESTIONS 1 & 2: 0
SUM OF ALL RESPONSES TO PHQ QUESTIONS 1-9: 0
2. FEELING DOWN, DEPRESSED OR HOPELESS: 0
SUM OF ALL RESPONSES TO PHQ QUESTIONS 1-9: 0

## 2021-09-10 ASSESSMENT — ENCOUNTER SYMPTOMS
SHORTNESS OF BREATH: 0
COUGH: 0
WHEEZING: 0
ABDOMINAL PAIN: 0
COLOR CHANGE: 0
BACK PAIN: 1
BOWEL INCONTINENCE: 0

## 2021-09-10 NOTE — PROGRESS NOTES
[address] [facility] [address]  Dept: [phone]  Dept Fax: [phone]: [phone]     Visit Date:  9/10/2021      Patient:  Khushi Ochoa  YOB: 1960    HPI:     Chief Complaint   Patient presents with    Back Pain     C/O back pain off and on for years, no known injury but fell at work 5-10 year, seems to be getting worse. Pt presents to the office today with chronic back pain that is getting worse. Sitting she is OK, but standing or doing activity makes it worse. Had PT with [facility] in the 1980's. No new injury. She just is having trouble at work with wlaking any sort of distance without pain or having to stop. Pt denies any pain down her legs. Back Pain  This is a chronic problem. The current episode started more than 1 year ago. The problem occurs daily. The problem has been gradually worsening since onset. The pain is present in the lumbar spine. The quality of the pain is described as aching. The pain does not radiate. The pain is moderate. The symptoms are aggravated by standing (walking). Stiffness is present in the morning. Pertinent negatives include no abdominal pain, bladder incontinence, bowel incontinence, chest pain, dysuria, fever, headaches, leg pain, paresis, paresthesias, pelvic pain, perianal numbness, tingling or weakness. Risk factors include obesity, sedentary lifestyle and lack of exercise. She has tried NSAIDs, walking, heat and analgesics for the symptoms. The treatment provided mild relief. Pt has not been to the office in over 3 years, labs and wellness exam due.      Medications    Current Outpatient Medications:     tiZANidine (ZANAFLEX) 4 MG tablet, Take 1 tablet by mouth every 8 hours as needed (back pain), Disp: 30 tablet, Rfl: 0    albuterol (PROVENTIL) (2.5 MG/3ML) 0.083% nebulizer solution, USE 1 VIAL IN NEBULIZER THREE TIMES DAILY AS NEEDED, Disp: , Rfl:   ibuprofen (ADVIL;MOTRIN) 200 MG tablet, Take 200 mg by mouth every 6 hours as needed for Pain Taking 3 tabs, Disp: , Rfl:     The patient has No Known Allergies. Past Medical History  Margaret Jeronimo  has a past medical history of Hyperlipidemia, IFG (impaired fasting glucose), and Morbid obesity ([address] Subjective:      Review of Systems   Constitutional: Negative for fever. Respiratory: Negative for cough, shortness of breath and wheezing. Cardiovascular: Negative for chest pain, palpitations and leg swelling. Gastrointestinal: Negative for abdominal pain and bowel incontinence. Genitourinary: Negative for bladder incontinence, dysuria and pelvic pain. Musculoskeletal: Positive for back pain and gait problem. Negative for neck pain. Skin: Negative for color change. Neurological: Negative for dizziness, tingling, weakness, headaches and paresthesias. Objective:     /74   Pulse 92   Resp 14   Ht 5' 1\" (1.549 m)   Wt 257 lb (116.6 kg)   BMI 48.56 kg/m²     Physical Exam  Vitals reviewed. Constitutional:       General: She is not in acute distress. Appearance: She is well-developed. HENT:      Head: Normocephalic and atraumatic. Eyes:      Conjunctiva/sclera: Conjunctivae normal.   Pulmonary:      Effort: Pulmonary effort is normal. No respiratory distress. Musculoskeletal:      Thoracic back: Normal. No spasms or tenderness. Normal range of motion. No scoliosis. Lumbar back: Spasms and tenderness present. No swelling or signs of trauma. Decreased range of motion. Right lower leg: No edema. Left lower leg: No edema. Skin:     General: Skin is warm and dry. Neurological:      General: No focal deficit present. Mental Status: She is alert and oriented to person, place, and time. Coordination: Coordination normal.   Psychiatric:         Mood and Affect: Mood normal.         Behavior: Behavior normal.         Thought Content:  Thought content normal. Judgment: Judgment normal.         Assessment/Plan:      Judith Jerome was seen today for back pain. Diagnoses and all orders for this visit:    Chronic bilateral low back pain without sciatica  -     XR LUMBAR SPINE (MIN 6 VIEWS); Future  -     [address], DO Jason, Pain Medicine, [place]  -     tiZANidine (ZANAFLEX) 4 MG tablet; Take 1 tablet by mouth every 8 hours as needed (back pain)  -     External Referral To Physical Therapy    Morbid obesity ([address]  -     Comprehensive Metabolic Panel; Future    Hyperlipidemia, unspecified hyperlipidemia type  -     Lipid Panel; Future  -     Comprehensive Metabolic Panel; Future    IFG (impaired fasting glucose)  -     TSH without Reflex; Future  -     T4, Free; Future  -     Comprehensive Metabolic Panel; Future  -     Hemoglobin A1C; Future    - Rest and alternate ice and heat to the sore areas. - PT referral, pt will take order and have completed at [facility]. - referral to pain management for evaluation.  - OK to use zanaflex as needed for spasms.   - Discussed a wellness exam and labs give to have completed before that appt. Pt agreeable to plan. - Call office with any questions or concerns, or if symptoms are getting worse or changing      Return in 4 weeks (on 10/8/2021), or if symptoms worsen or fail to improve, for Medicare AWV, Wellness/Physical.    Patient given educational materials - see patient instructions. Discussed use, benefit, and side effects of prescribed medications. All patient questions answered. Pt voiced understanding.         Electronically signed by NIKI Diaz CNP on 9/10/2021 at 1:29 PM

## 2021-09-10 NOTE — PATIENT INSTRUCTIONS
Patient Education        Back Stretches: Exercises  Introduction  Here are some examples of exercises for stretching your back. Start each exercise slowly. Ease off the exercise if you start to have pain. Your doctor or physical therapist will tell you when you can start these exercises and which ones will work best for you. How to do the exercises  Overhead stretch   1. Stand comfortably with your feet shoulder-width apart. 2. Looking straight ahead, raise both arms over your head and reach toward the ceiling. Do not allow your head to tilt back. 3. Hold for 15 to 30 seconds, then lower your arms to your sides. 4. Repeat 2 to 4 times. Side stretch   1. Stand comfortably with your feet shoulder-width apart. 2. Raise one arm over your head, and then lean to the other side. 3. Slide your hand down your leg as you let the weight of your arm gently stretch your side muscles. Hold for 15 to 30 seconds. 4. Repeat 2 to 4 times on each side. Press-up   1. Lie on your stomach, supporting your body with your forearms. 2. Press your elbows down into the floor to raise your upper back. As you do this, relax your stomach muscles and allow your back to arch without using your back muscles. As your press up, do not let your hips or pelvis come off the floor. 3. Hold for 15 to 30 seconds, then relax. 4. Repeat 2 to 4 times. Relax and rest   1. Lie on your back with a rolled towel under your neck and a pillow under your knees. Extend your arms comfortably to your sides. 2. Relax and breathe normally. 3. Remain in this position for about 10 minutes. 4. If you can, do this 2 or 3 times each day. Follow-up care is a key part of your treatment and safety. Be sure to make and go to all appointments, and call your doctor if you are having problems. It's also a good idea to know your test results and keep a list of the medicines you take. Where can you learn more? Go to [email or web address]://[email or web address].[email or web address]. org and sign in to your [employer] account. Enter J837 in the [employer] box to learn more about \"Back Stretches: Exercises. \"     If you do not have an account, please click on the \"Sign Up Now\" link. Current as of: November 16, 2020               Content Version: 12.9  © 4291-4943 Healthwise, Incorporated. Care instructions adapted under license by [facility] ([facility]). If you have questions about a medical condition or this instruction, always ask your healthcare professional. [address] any warranty or liability for your use of this information.

## 2021-12-27 ENCOUNTER — APPOINTMENT (OUTPATIENT)
Dept: GENERAL RADIOLOGY | Age: 61
End: 2021-12-27
Payer: COMMERCIAL

## 2021-12-27 ENCOUNTER — HOSPITAL ENCOUNTER (EMERGENCY)
Age: 61
Discharge: HOME | End: 2021-12-27
Payer: COMMERCIAL

## 2021-12-27 VITALS
OXYGEN SATURATION: 93 % | DIASTOLIC BLOOD PRESSURE: 61 MMHG | BODY MASS INDEX: 49.09 KG/M2 | RESPIRATION RATE: 24 BRPM | TEMPERATURE: 97.7 F | SYSTOLIC BLOOD PRESSURE: 129 MMHG | WEIGHT: 260 LBS | HEIGHT: 61 IN | HEART RATE: 89 BPM

## 2021-12-27 DIAGNOSIS — J02.9 VIRAL PHARYNGITIS: ICD-10-CM

## 2021-12-27 DIAGNOSIS — J06.9 VIRAL URI WITH COUGH: Primary | ICD-10-CM

## 2021-12-27 LAB
FLU A ANTIGEN: NEGATIVE
FLU B ANTIGEN: NEGATIVE
SARS-COV-2, NAA: NOT  DETECTED

## 2021-12-27 PROCEDURE — 99213 OFFICE O/P EST LOW 20 MIN: CPT | Performed by: EMERGENCY MEDICINE

## 2021-12-27 PROCEDURE — 71046 X-RAY EXAM CHEST 2 VIEWS: CPT

## 2021-12-27 PROCEDURE — 99213 OFFICE O/P EST LOW 20 MIN: CPT

## 2021-12-27 PROCEDURE — 87635 SARS-COV-2 COVID-19 AMP PRB: CPT

## 2021-12-27 PROCEDURE — 87804 INFLUENZA ASSAY W/OPTIC: CPT

## 2021-12-27 RX ORDER — PREDNISONE 20 MG/1
20 TABLET ORAL 2 TIMES DAILY
Qty: 10 TABLET | Refills: 0 | Status: SHIPPED | OUTPATIENT
Start: 2021-12-27 | End: 2022-01-01

## 2021-12-27 RX ORDER — AMOXICILLIN 875 MG/1
875 TABLET, COATED ORAL 2 TIMES DAILY
Qty: 20 TABLET | Refills: 0 | Status: SHIPPED | OUTPATIENT
Start: 2021-12-27 | End: 2022-01-06

## 2021-12-27 ASSESSMENT — PAIN DESCRIPTION - PROGRESSION: CLINICAL_PROGRESSION: NOT CHANGED

## 2021-12-27 ASSESSMENT — ENCOUNTER SYMPTOMS
SORE THROAT: 1
ABDOMINAL PAIN: 0
COLOR CHANGE: 0
COUGH: 1
SHORTNESS OF BREATH: 1

## 2021-12-27 ASSESSMENT — PAIN SCALES - GENERAL: PAINLEVEL_OUTOF10: 4

## 2021-12-27 ASSESSMENT — PAIN DESCRIPTION - ONSET: ONSET: ON-GOING

## 2021-12-27 ASSESSMENT — PAIN DESCRIPTION - DESCRIPTORS: DESCRIPTORS: ACHING

## 2021-12-27 ASSESSMENT — PAIN DESCRIPTION - LOCATION: LOCATION: EAR

## 2021-12-27 ASSESSMENT — PAIN DESCRIPTION - PAIN TYPE: TYPE: ACUTE PAIN

## 2021-12-27 ASSESSMENT — PAIN DESCRIPTION - ORIENTATION: ORIENTATION: RIGHT

## 2021-12-27 ASSESSMENT — PAIN DESCRIPTION - FREQUENCY: FREQUENCY: CONTINUOUS

## 2021-12-27 NOTE — ED TRIAGE NOTES
To room with c/o chest congestion, cough, sore throat, right ear discomfort and sinus trouble that started Wednesday. She gets tested for COVID every mon/ thurs threw employer and has not heard of a positive result.

## 2021-12-27 NOTE — ED NOTES
Patient understood instructions verbally,  Follow up with PCP with any concerns, e-script. Worse congestion, elevated fevers,  follow up with ED.ambulated self to lobby,stable condition. All belongings with patient.      Dl Martinez LPN  83/01/68 0314

## 2021-12-27 NOTE — ED PROVIDER NOTES
[facility]       CHIEF COMPLAINT     No chief complaint on file. Nurses Notes reviewed and I agree except as noted in the HPI. HISTORY OF PRESENT ILLNESS   Cristal Stroud is a 59 y.o. female who presents for congestion of the sinuses, cough, sore throat, body aches, chills, symptoms x4 to 5 days. She states she gets tested for Covid twice a week but has not had a Covid test since her symptoms began. Patient reports that she was around family members last week for [place] and family members children were sick. Patient reports that she has had previous Covid illness in November last year. Symptoms feel different than Covid symptoms. Patient is a smoker. She states she feels mildly short of breath but not significant shortness of breath    HPI    REVIEW OF SYSTEMS     Review of Systems   Constitutional: Positive for fatigue. Negative for fever. HENT: Positive for congestion and sore throat. Respiratory: Positive for cough and shortness of breath. Cardiovascular: Negative for chest pain, palpitations and leg swelling. Gastrointestinal: Negative for abdominal pain. Skin: Negative for color change. Neurological: Negative for dizziness. Psychiatric/Behavioral: Negative for behavioral problems. PAST MEDICAL HISTORY         Diagnosis Date    Hyperlipidemia 2016    IFG (impaired fasting glucose) 2016    Morbid obesity ([facility] [address] 3/11/2015       SURGICALHISTORY     Patient  has a past surgical history that includes Rotator cuff repair ();  section (, ); Hysterectomy (~); and Septoplasty (2016).     CURRENT MEDICATIONS       Discharge Medication List as of 2021 10:41 AM      CONTINUE these medications which have NOT CHANGED    Details   Pseudoeph-Doxylamine-DM-APAP (NYQUIL PO) Take by mouthHistorical Med      albuterol (PROVENTIL) (2.5 MG/3ML) 0.083% nebulizer solution USE 1 VIAL IN NEBULIZER THREE TIMES DAILY AS NEEDEDHistorical Med      ibuprofen (ADVIL;MOTRIN) 200 MG tablet Take 200 mg by mouth every 6 hours as needed for Pain Taking 3 tabs      tiZANidine (ZANAFLEX) 4 MG tablet Take 1 tablet by mouth every 8 hours as needed (back pain), Disp-30 tablet, R-0Normal             ALLERGIES     Patient is has No Known Allergies. Patients   There is no immunization history on file for this patient. FAMILY HISTORY     Patient's family history includes Cancer in her father and mother; Diabetes in her father; Heart Disease in her maternal grandfather, maternal grandmother, paternal grandfather, and paternal grandmother; High Blood Pressure in her maternal grandfather, maternal grandmother, paternal grandfather, and paternal grandmother. SOCIAL HISTORY     Patient  reports that she has been smoking cigarettes. She has a 17.50 pack-year smoking history. She has never used smokeless tobacco. She reports previous alcohol use. She reports that she does not use drugs. PHYSICAL EXAM     ED TRIAGE VITALS  BP: 129/61, Temp: 97.7 °F (36.5 °C), Pulse: 89, Resp: 24, SpO2: 93 %,Estimated body mass index is 49.13 kg/m² as calculated from the following:    Height as of this encounter: 5' 1\" (1.549 m). Weight as of this encounter: 260 lb (117.9 kg). ,No LMP recorded. Patient has had a hysterectomy. Physical Exam  Constitutional:       General: She is not in acute distress. HENT:      Head: Normocephalic. Right Ear: Tympanic membrane is injected and erythematous. Mouth/Throat:      Mouth: Mucous membranes are moist.   Cardiovascular:      Rate and Rhythm: Normal rate. Pulses: Normal pulses. Pulmonary:      Effort: Pulmonary effort is normal.      Breath sounds: Decreased breath sounds present. Abdominal:      General: Abdomen is flat. Bowel sounds are normal. There is no distension. Tenderness: There is no abdominal tenderness. There is no guarding. Skin:     General: Skin is warm and dry. Capillary Refill: Capillary refill takes less than 2 seconds. Neurological:      Mental Status: She is alert. Psychiatric:         Mood and Affect: Mood normal.         Behavior: Behavior normal.         DIAGNOSTIC RESULTS     Labs:  Results for orders placed or performed during the hospital encounter of 12/27/21   Rapid influenza A/B antigens   Result Value Ref Range    Flu A Antigen Negative NEGATIVE    Flu B Antigen Negative NEGATIVE   COVID-19, Rapid   Result Value Ref Range    SARS-CoV-2, WILLI NOT  DETECTED NOT DETECTED       IMAGING:    XR CHEST (2 VW)   Final Result   Pulmonary hyperinflation and chronic findings are stable. No acute intrathoracic process is observed. **This report has been created using voice recognition software. It may contain minor errors which are inherent in voice recognition technology. **      Final report electronically signed by Dr Lilibeth Burr on 12/27/2021 10:22 AM            EKG:      URGENT CARE COURSE:     Vitals:    12/27/21 0939   BP: 129/61   Pulse: 89   Resp: 24   Temp: 97.7 °F (36.5 °C)   TempSrc: Temporal   SpO2: 93%   Weight: 260 lb (117.9 kg)   Height: 5' 1\" (1.549 m)       Medications - No data to display         PROCEDURES:  None    FINAL IMPRESSION      1. Viral URI with cough    2. Viral pharyngitis          DISPOSITION/ PLAN   Patient presents for complaints of cough, sore throat, congestion. Chest x-ray shows no acute findings. Cough symptoms are likely due to COPD. Patient continues to smoke. Patient likely has viral pharyngitis. Rapid strep, influenza and Covid testing are negative. At the time of discharge, the patient requested me to check her right ear as still having severe pain. When I evaluated she does have injection and erythema to the right TM. We will treat with amoxicillin. We will treat chest complaints with prednisone. Advise drink plenty fluids.   Return for new or worsening symptoms        PATIENT REFERRED TO:  Brenna Wyatt MD Judy Anderson 66 / RODGER [address]      DISCHARGE MEDICATIONS:  Discharge Medication List as of 12/27/2021 10:41 AM      START taking these medications    Details   predniSONE (DELTASONE) 20 MG tablet Take 1 tablet by mouth 2 times daily for 5 days, Disp-10 tablet, R-0Normal             Discharge Medication List as of 12/27/2021 10:41 AM          Discharge Medication List as of 12/27/2021 10:41 AM          NIKI Gautam CNP    (Please note that portions of this note were completed with a voice recognition program. Efforts were made to edit the dictations but occasionally words are mis-transcribed.)          NIKI Gautam CNP  12/27/21 8185

## 2021-12-30 ENCOUNTER — OFFICE VISIT (OUTPATIENT)
Dept: FAMILY MEDICINE CLINIC | Age: 61
End: 2021-12-30
Payer: COMMERCIAL

## 2021-12-30 ENCOUNTER — TELEPHONE (OUTPATIENT)
Dept: FAMILY MEDICINE CLINIC | Age: 61
End: 2021-12-30

## 2021-12-30 VITALS
HEART RATE: 84 BPM | DIASTOLIC BLOOD PRESSURE: 78 MMHG | WEIGHT: 260 LBS | BODY MASS INDEX: 49.13 KG/M2 | SYSTOLIC BLOOD PRESSURE: 124 MMHG | RESPIRATION RATE: 18 BRPM

## 2021-12-30 DIAGNOSIS — H60.501 ACUTE OTITIS EXTERNA OF RIGHT EAR, UNSPECIFIED TYPE: Primary | ICD-10-CM

## 2021-12-30 PROCEDURE — 99213 OFFICE O/P EST LOW 20 MIN: CPT | Performed by: NURSE PRACTITIONER

## 2021-12-30 RX ORDER — ALBUTEROL SULFATE 2.5 MG/3ML
SOLUTION RESPIRATORY (INHALATION)
Qty: 25 EACH | Refills: 2 | Status: SHIPPED | OUTPATIENT
Start: 2021-12-30 | End: 2022-01-21 | Stop reason: SDUPTHER

## 2021-12-30 RX ORDER — CIPROFLOXACIN AND DEXAMETHASONE 3; 1 MG/ML; MG/ML
4 SUSPENSION/ DROPS AURICULAR (OTIC) 2 TIMES DAILY
Qty: 7.5 ML | Refills: 0 | Status: SHIPPED | OUTPATIENT
Start: 2021-12-30 | End: 2022-01-06

## 2021-12-30 ASSESSMENT — ENCOUNTER SYMPTOMS
NAUSEA: 0
VOMITING: 0
SHORTNESS OF BREATH: 0
BLOOD IN STOOL: 0
COUGH: 1
CONSTIPATION: 0
DIARRHEA: 0

## 2021-12-30 NOTE — PROGRESS NOTES
Chief Complaint   Patient presents with    Follow-up     Went to  on 12/27. Feeling better overall, but still having ear pain.  provider never looked at it. Right ear pain, feels full, has a ringning since monday. Sandy Rodriguez is a 59 y. o.female      Pt complains of right ear pain, states it feels like there is something in it. Also notes some humming. Patient was evaluated in  3 days ago and was placed on prednisone and amoxicillin for an upper respiratory infection. Chest x-ray was completed at that time. Covid and influenza testing was negative. She is improving overall but states that her ear seems to be getting worse. Review of Systems   Constitutional: Negative for chills, diaphoresis and fever. HENT: Positive for ear pain (Right ear) and tinnitus (Right ear). Respiratory: Positive for cough. Negative for shortness of breath. Cardiovascular: Negative for chest pain, palpitations and leg swelling. Gastrointestinal: Negative for blood in stool, constipation, diarrhea, nausea and vomiting. Genitourinary: Negative for dysuria and hematuria. Musculoskeletal: Negative for myalgias. Neurological: Negative for dizziness and headaches. All other systems reviewed and are negative. OBJECTIVE     /78   Pulse 84   Resp 18   Wt 260 lb (117.9 kg)   BMI 49.13 kg/m²     Physical Exam  Vitals and nursing note reviewed. Constitutional:       Appearance: She is well-developed. HENT:      Head: Normocephalic and atraumatic. Right Ear: External ear normal. There is impacted cerumen. Left Ear: External ear normal. Swelling and tenderness present. Tympanic membrane is bulging. Nose: Nose normal.   Eyes:      Conjunctiva/sclera: Conjunctivae normal.      Pupils: Pupils are equal, round, and reactive to light. Cardiovascular:      Rate and Rhythm: Normal rate and regular rhythm. Heart sounds: Normal heart sounds.    Pulmonary: Effort: Pulmonary effort is normal.      Breath sounds: Normal breath sounds. Abdominal:      General: Bowel sounds are normal.      Palpations: Abdomen is soft. Musculoskeletal:         General: Normal range of motion. Cervical back: Normal range of motion and neck supple. Skin:     General: Skin is warm and dry. Neurological:      Mental Status: She is alert and oriented to person, place, and time. Deep Tendon Reflexes: Reflexes are normal and symmetric. Psychiatric:         Behavior: Behavior normal.         Thought Content: Thought content normal.         Judgment: Judgment normal.           No results found for this visit on 12/30/21. ASSESSMENT       Diagnosis Orders   1. Acute otitis externa of right ear, unspecified type         PLAN     Requested Prescriptions     Signed Prescriptions Disp Refills    albuterol (PROVENTIL) (2.5 MG/3ML) 0.083% nebulizer solution 25 each 2     Sig: USE 1 VIAL IN NEBULIZER THREE TIMES DAILY AS NEEDED    ciprofloxacin-dexamethasone (CIPRODEX) 0.3-0.1 % otic suspension 7.5 mL 0     Sig: Place 4 drops into the right ear 2 times daily for 7 days     Ciprodex sent to pharmacy  Albuterol nebulizer refills sent in per patient request  Continue oral antibiotic and steroid  Follow-up if symptoms fail to improve or worsen    No orders of the defined types were placed in this encounter.               Electronically signed by NIKI Deluca CNP on 12/30/2021 at 3:59 PM

## 2021-12-30 NOTE — TELEPHONE ENCOUNTER
Patient currently seen by TS today and TS sent in a refill of Albuterol nebulizer solution into [facility] on [address] [place] [address]

## 2021-12-30 NOTE — LETTER
[facility]  [address]  [facility] [address]  Phone: [phone]  Fax: [phone]    NIKI Almendarez CNP        December 30, 2021     Patient: oNlberto Hu   YOB: 1960   Date of Visit: 12/30/2021       To Whom it May Concern:    Rommel Benson was seen in my clinic on 12/30/2021. She may return to work on 1/3/21. If you have any questions or concerns, please don't hesitate to call.     Sincerely,         NIKI Almendarez CNP

## 2021-12-30 NOTE — TELEPHONE ENCOUNTER
----- Message from Charlie Bell sent at 12/30/2021  9:44 AM EST -----  Subject: Refill Request    QUESTIONS  Name of Medication? albuterol (PROVENTIL) (2.5 MG/3ML) 0.083% nebulizer   solution  Patient-reported dosage and instructions? USE 1 VIAL IN NEBULIZER THREE   TIMES DAILY AS NEEDED  How many days do you have left? 0  Preferred Pharmacy? [facility] phone number (if available)? [phone]  ---------------------------------------------------------------------------  --------------  CALL BACK INFO  What is the best way for the office to contact you? OK to leave message on   voicemail  Preferred Call Back Phone Number?  [phone]

## 2021-12-30 NOTE — PATIENT INSTRUCTIONS

## 2022-01-21 ENCOUNTER — OFFICE VISIT (OUTPATIENT)
Dept: FAMILY MEDICINE CLINIC | Age: 62
End: 2022-01-21
Payer: COMMERCIAL

## 2022-01-21 VITALS
BODY MASS INDEX: 49.13 KG/M2 | DIASTOLIC BLOOD PRESSURE: 84 MMHG | RESPIRATION RATE: 18 BRPM | WEIGHT: 260 LBS | TEMPERATURE: 99.4 F | OXYGEN SATURATION: 96 % | HEART RATE: 88 BPM | SYSTOLIC BLOOD PRESSURE: 128 MMHG

## 2022-01-21 DIAGNOSIS — J20.9 BRONCHITIS WITH BRONCHOSPASM: ICD-10-CM

## 2022-01-21 DIAGNOSIS — J34.89 SINUS PRESSURE: ICD-10-CM

## 2022-01-21 DIAGNOSIS — R05.9 COUGH: Primary | ICD-10-CM

## 2022-01-21 LAB
Lab: NORMAL
QC PASS/FAIL: NORMAL
SARS-COV-2 RDRP RESP QL NAA+PROBE: NEGATIVE

## 2022-01-21 PROCEDURE — 87635 SARS-COV-2 COVID-19 AMP PRB: CPT | Performed by: NURSE PRACTITIONER

## 2022-01-21 PROCEDURE — 99213 OFFICE O/P EST LOW 20 MIN: CPT | Performed by: NURSE PRACTITIONER

## 2022-01-21 RX ORDER — METHYLPREDNISOLONE 4 MG/1
TABLET ORAL
Qty: 1 KIT | Refills: 0 | Status: SHIPPED | OUTPATIENT
Start: 2022-01-21 | End: 2022-01-27

## 2022-01-21 RX ORDER — FLUTICASONE PROPIONATE 50 MCG
2 SPRAY, SUSPENSION (ML) NASAL DAILY
Qty: 48 G | Refills: 1 | Status: SHIPPED | OUTPATIENT
Start: 2022-01-21

## 2022-01-21 RX ORDER — ALBUTEROL SULFATE 2.5 MG/3ML
SOLUTION RESPIRATORY (INHALATION)
Qty: 25 EACH | Refills: 2 | Status: SHIPPED | OUTPATIENT
Start: 2022-01-21

## 2022-01-21 RX ORDER — AZITHROMYCIN 250 MG/1
250 TABLET, FILM COATED ORAL SEE ADMIN INSTRUCTIONS
Qty: 6 TABLET | Refills: 0 | Status: SHIPPED | OUTPATIENT
Start: 2022-01-21 | End: 2022-01-26

## 2022-01-21 ASSESSMENT — ENCOUNTER SYMPTOMS
RHINORRHEA: 1
HEMOPTYSIS: 0
COUGH: 1
SHORTNESS OF BREATH: 1
HEARTBURN: 0
WHEEZING: 0
SORE THROAT: 0

## 2022-01-21 NOTE — PROGRESS NOTES
[address] [address]  Dept: [phone]  Dept Fax: [phone]: [phone]     Visit Date:  1/21/2022      Patient:  Huey Cao  YOB: 1960    HPI:     Chief Complaint   Patient presents with    Cough     C/O cough, fatigue, SOB, did have sore throat, nasal congestion since wednesday. Was exposed to a URI. Pt presents to the office today for cough, drainage and ongoing sinus issues. She reports that she has had this for over 2 weeks, but Wednesday she started coughing. Negative COVID and flu at home. Pt gets tested for OVID x 2 per week. She has been using her albuterol nebs at home TID with some relief. Cough  This is a new problem. The current episode started in the past 7 days. The problem has been gradually worsening. The cough is productive of sputum. Associated symptoms include chills, nasal congestion, postnasal drip, rhinorrhea and shortness of breath. Pertinent negatives include no chest pain, ear congestion, ear pain, fever, heartburn, hemoptysis, myalgias, rash, sore throat, sweats, weight loss or wheezing. The symptoms are aggravated by lying down. She has tried rest, body position changes, cool air and OTC cough suppressant for the symptoms. The treatment provided mild relief. There is no history of asthma, bronchiectasis, bronchitis, emphysema, environmental allergies or pneumonia.            Medications    Current Outpatient Medications:     azithromycin (ZITHROMAX) 250 MG tablet, Take 1 tablet by mouth See Admin Instructions for 5 days 500mg on day 1 followed by 250mg on days 2 - 5, Disp: 6 tablet, Rfl: 0    methylPREDNISolone (MEDROL DOSEPACK) 4 MG tablet, Take by mouth., Disp: 1 kit, Rfl: 0    albuterol (PROVENTIL) (2.5 MG/3ML) 0.083% nebulizer solution, USE 1 VIAL IN NEBULIZER THREE TIMES DAILY AS NEEDED, Disp: 25 each, Rfl: 2    fluticasone (FLONASE) 50 MCG/ACT nasal spray, 2 sprays by Each Nostril route daily, Disp: 48 g, Rfl: 1    Pseudoeph-Doxylamine-DM-APAP (NYQUIL PO), Take by mouth, Disp: , Rfl:     tiZANidine (ZANAFLEX) 4 MG tablet, Take 1 tablet by mouth every 8 hours as needed (back pain), Disp: 30 tablet, Rfl: 0    ibuprofen (ADVIL;MOTRIN) 200 MG tablet, Take 200 mg by mouth every 6 hours as needed for Pain Taking 3 tabs, Disp: , Rfl:     The patient has No Known Allergies. Past Medical History  Preet Miles  has a past medical history of Hyperlipidemia, IFG (impaired fasting glucose), and Morbid obesity ([address] Subjective:      Review of Systems   Constitutional: Positive for chills. Negative for fever and weight loss. HENT: Positive for postnasal drip and rhinorrhea. Negative for ear pain and sore throat. Respiratory: Positive for cough and shortness of breath. Negative for hemoptysis and wheezing. Cardiovascular: Negative for chest pain. Gastrointestinal: Negative for heartburn. Musculoskeletal: Negative for myalgias. Skin: Negative for rash. Allergic/Immunologic: Negative for environmental allergies. Objective:     /84   Pulse 88   Temp 99.4 °F (37.4 °C) (Oral)   Resp 18   Wt 260 lb (117.9 kg)   SpO2 96%   BMI 49.13 kg/m²     Physical Exam  Vitals reviewed. Constitutional:       General: She is not in acute distress. Appearance: Normal appearance. She is well-developed. HENT:      Head: Normocephalic and atraumatic. Right Ear: Hearing, ear canal and external ear normal. A middle ear effusion is present. Tympanic membrane is not scarred, perforated or erythematous. Left Ear: Hearing, ear canal and external ear normal. A middle ear effusion is present. Tympanic membrane is not scarred, perforated or erythematous. Nose: Congestion and rhinorrhea present. No nasal tenderness. Mouth/Throat:      Lips: Pink. Mouth: Mucous membranes are moist. No oral lesions. Pharynx: Oropharynx is clear. Uvula midline. Eyes:      General:         Right eye: No discharge. Left eye: No discharge. Conjunctiva/sclera: Conjunctivae normal.   Neck:      Trachea: No tracheal deviation. Cardiovascular:      Rate and Rhythm: Normal rate and regular rhythm. Heart sounds: Normal heart sounds. No murmur heard. Pulmonary:      Effort: Pulmonary effort is normal. No respiratory distress. Breath sounds: Normal breath sounds. Abdominal:      General: Bowel sounds are normal.      Palpations: Abdomen is soft. Tenderness: There is no abdominal tenderness. There is no right CVA tenderness or left CVA tenderness. Musculoskeletal:      Cervical back: Full passive range of motion without pain and neck supple. Lymphadenopathy:      Head:      Right side of head: No submental, submandibular, tonsillar, preauricular, posterior auricular or occipital adenopathy. Left side of head: No submental, submandibular, tonsillar, preauricular, posterior auricular or occipital adenopathy. Cervical: No cervical adenopathy. Skin:     General: Skin is warm and dry. Findings: No rash. Neurological:      General: No focal deficit present. Mental Status: She is alert and oriented to person, place, and time. Coordination: Coordination normal.   Psychiatric:         Mood and Affect: Mood normal.         Behavior: Behavior normal.         Thought Content: Thought content normal.         Judgment: Judgment normal.         Assessment/Plan:      Rush Myers was seen today for cough. Diagnoses and all orders for this visit:    Cough  -     POCT COVID-19 Rapid, NAAT  -     albuterol (PROVENTIL) (2.5 MG/3ML) 0.083% nebulizer solution; USE 1 VIAL IN NEBULIZER THREE TIMES DAILY AS NEEDED    Bronchitis with bronchospasm  -     azithromycin (ZITHROMAX) 250 MG tablet;  Take 1 tablet by mouth See Admin Instructions for 5 days 500mg on day 1 followed by 250mg on days 2 - 5  -     methylPREDNISolone (MEDROL DOSEPACK) 4 MG tablet; Take by mouth. Sinus pressure  -     fluticasone (FLONASE) 50 MCG/ACT nasal spray; 2 sprays by Each Nostril route daily    - COVID test today negative. - Rest and increase fluids  - Tylenol as needed for pain and fever  - Start daily flonase   - Good handwashing and clean all surfaces touched  - Call office with any questions or concerns, or if symptoms are getting worse or changing  - ER for any chest pain or SOB    Return if symptoms worsen or fail to improve. Patient given educational materials - see patient instructions. Discussed use, benefit, and side effects of prescribed medications. All patient questions answered. Pt voiced understanding.         Electronically signed by NIKI Yañez CNP on 1/21/2022 at 2:06 PM

## 2022-01-21 NOTE — PATIENT INSTRUCTIONS
Patient Education        Bronchitis: Care Instructions  Your Care Instructions     Bronchitis is inflammation of the bronchial tubes, which carry air to the lungs. The tubes swell and produce mucus, or phlegm. The mucus and inflamed bronchial tubes make you cough. You may have trouble breathing. Most cases of bronchitis are caused by viruses like those that cause colds. Antibiotics usually do not help and they may be harmful. Bronchitis usually develops rapidly and lasts about 2 to 3 weeks in otherwise healthy people. Follow-up care is a key part of your treatment and safety. Be sure to make and go to all appointments, and call your doctor if you are having problems. It's also a good idea to know your test results and keep a list of the medicines you take. How can you care for yourself at home? · Take all medicines exactly as prescribed. Call your doctor if you think you are having a problem with your medicine. · Get some extra rest.  · Take an over-the-counter pain medicine, such as acetaminophen (Tylenol), ibuprofen (Advil, Motrin), or naproxen (Aleve) to reduce fever and relieve body aches. Read and follow all instructions on the label. · Do not take two or more pain medicines at the same time unless the doctor told you to. Many pain medicines have acetaminophen, which is Tylenol. Too much acetaminophen (Tylenol) can be harmful. · Take an over-the-counter cough medicine to help quiet a dry, hacking cough so that you can sleep. Avoid cough medicines that have more than one active ingredient. Read and follow all instructions on the label. · Do not smoke. Smoking can make bronchitis worse. If you need help quitting, talk to your doctor about stop-smoking programs and medicines. These can increase your chances of quitting for good. When should you call for help? Call 911 anytime you think you may need emergency care. For example, call if:    · You have severe trouble breathing.    Call your doctor now or seek immediate medical care if:    · You have new or worse trouble breathing.     · You cough up dark brown or bloody mucus (sputum).     · You have a new or higher fever.     · You have a new rash. Watch closely for changes in your health, and be sure to contact your doctor if:    · You cough more deeply or more often, especially if you notice more mucus or a change in the color of your mucus.     · You are not getting better as expected. Where can you learn more? Go to https://[employer]. org and sign in to your [employer] account. Enter H333 in the [facility] box to learn more about \"Bronchitis: Care Instructions. \"     If you do not have an account, please click on the \"Sign Up Now\" link. Current as of: July 6, 2021               Content Version: 13.1  © 8297-5339 Healthwise, Incorporated. Care instructions adapted under license by [facility] ([facility]). If you have questions about a medical condition or this instruction, always ask your healthcare professional. [address] any warranty or liability for your use of this information.

## 2022-02-17 ENCOUNTER — TELEMEDICINE (OUTPATIENT)
Dept: FAMILY MEDICINE CLINIC | Age: 62
End: 2022-02-17
Payer: COMMERCIAL

## 2022-02-17 DIAGNOSIS — R09.89 CHEST CONGESTION: ICD-10-CM

## 2022-02-17 DIAGNOSIS — R09.81 SINUS CONGESTION: ICD-10-CM

## 2022-02-17 DIAGNOSIS — R06.02 SOB (SHORTNESS OF BREATH): ICD-10-CM

## 2022-02-17 DIAGNOSIS — R05.9 COUGH: Primary | ICD-10-CM

## 2022-02-17 LAB
INFLUENZA A ANTIBODY: NEGATIVE
INFLUENZA B ANTIBODY: NEGATIVE
Lab: NORMAL
QC PASS/FAIL: NORMAL
SARS-COV-2 RDRP RESP QL NAA+PROBE: NEGATIVE

## 2022-02-17 PROCEDURE — 87804 INFLUENZA ASSAY W/OPTIC: CPT | Performed by: NURSE PRACTITIONER

## 2022-02-17 PROCEDURE — 87635 SARS-COV-2 COVID-19 AMP PRB: CPT | Performed by: NURSE PRACTITIONER

## 2022-02-17 PROCEDURE — 99443 PR PHYS/QHP TELEPHONE EVALUATION 21-30 MIN: CPT | Performed by: NURSE PRACTITIONER

## 2022-02-17 RX ORDER — PREDNISONE 20 MG/1
20 TABLET ORAL 2 TIMES DAILY
Qty: 10 TABLET | Refills: 0 | Status: SHIPPED | OUTPATIENT
Start: 2022-02-17 | End: 2022-02-18 | Stop reason: SDUPTHER

## 2022-02-17 ASSESSMENT — ENCOUNTER SYMPTOMS
CONSTIPATION: 0
DIARRHEA: 0
NAUSEA: 0
BLOOD IN STOOL: 0
COUGH: 1
VOMITING: 0
CHEST TIGHTNESS: 1
SHORTNESS OF BREATH: 1
RHINORRHEA: 1

## 2022-02-17 NOTE — PROGRESS NOTES
[facility]  Loy Madrigal  Dept: [phone]  Dept Fax: [phone]      TELEHEALTH EVALUATION -- Audio/Visual (During BDXLM-70 public health emergency)     Nell Luna was evaluated through a synchronous (real-time) audio-video encounter. The patient (or guardian if applicable) is aware that this is a billable service, which includes applicable co-pays. This Virtual Visit was conducted with patient's (and/or legal guardian's) consent. The visit was conducted pursuant to the emergency declaration under the [address] [place] [address], [address] [place] [address] authority and the [employer] Act. Patient identification was verified, and a caregiver was present when appropriate. The patient was located in a state where the provider was licensed to provide care. Kenneth Marino is a 59 y.o. female    Pt presents for c/o cough, chest congestion, sinus drainage, wheezing, sob starting 4 days ago. She is using her nebulizer but continues with sob. Oxygen level at work was 93%. Son is ill as well. 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 Some neck discomfort and headache but denies body aches. She hs been using nyquil and dayquil with minimal relief.  She has had pneumonia twice in the past.      Patient Active Problem List   Diagnosis    Morbid obesity ([facility]    IFG (impaired fasting glucose)    Hyperlipidemia    Tobacco abuse       Current Outpatient Medications   Medication Sig Dispense Refill    predniSONE (DELTASONE) 20 MG tablet Take 1 tablet by mouth 2 times daily for 5 days 10 tablet 0    albuterol (PROVENTIL) (2.5 MG/3ML) 0.083% nebulizer solution USE 1 VIAL IN NEBULIZER THREE TIMES DAILY AS NEEDED 25 each 2    fluticasone (FLONASE) 50 MCG/ACT nasal spray 2 sprays by Each Nostril route daily 48 g 1    Pseudoeph-Doxylamine-DM-APAP (NYQUIL PO) Take by mouth      tiZANidine (ZANAFLEX) 4 MG tablet Take 1 tablet by mouth every 8 hours as needed (back pain) 30 tablet 0    ibuprofen (ADVIL;MOTRIN) 200 MG tablet Take 200 mg by mouth every 6 hours as needed for Pain Taking 3 tabs       No current facility-administered medications for this visit. Review of Systems   Constitutional: Positive for fatigue. Negative for chills, diaphoresis and fever. HENT: Positive for congestion, postnasal drip and rhinorrhea. Respiratory: Positive for cough, chest tightness and shortness of breath. Cardiovascular: Negative for chest pain, palpitations and leg swelling. Gastrointestinal: Negative for blood in stool, constipation, diarrhea, nausea and vomiting. Genitourinary: Negative for dysuria and hematuria. Musculoskeletal: Negative for myalgias. Neurological: Negative for dizziness and headaches. All other systems reviewed and are negative. OBJECTIVE     Due to this being a TeleHealth encounter, evaluation of the following organ systems is limited: Vitals/Constitutional/EENT/Resp/CV/GI//MS/Neuro/Skin/Heme-Lymph-Imm. PHYSICAL EXAMINATION:  [ INSTRUCTIONS:  \"[x]\" Indicates a positive item  \"[]\" Indicates a negative item  -- DELETE ALL ITEMS NOT EXAMINED]  Vital Signs: (All Vital Signs are pt reported, unless otherwise noted)   There were no vitals taken for this visit.       Constitutional: [] Appears well-developed and well-nourished [x] No apparent distress      [] Abnormal-   Mental status  [x] Alert and awake  [x] Oriented to person/place/time [x]Able to follow commands               Psychiatric:       [x] Normal Affect [x] No Hallucinations        [] Abnormal-       Lab Results   Component Value Date    LABA1C 5.8 04/01/2016     No results found for: EAG    Lab Results   Component Value Date    CHOL 213 (H) 03/15/2016    TRIG 94 03/15/2016    HDL 51 03/15/2016    LDLDIRECT 163 (H) 03/15/2016       Lab Results   Component Value Date  07/21/2018    K 3.1 (L) 07/21/2018     07/21/2018    CO2 26 07/21/2018    BUN 9 07/21/2018    CREATININE 0.74 07/21/2018    GLUCOSE 119 (H) 07/21/2018    CALCIUM 8.7 (L) 07/21/2018    PROT 6.6 03/15/2016    LABALBU 4.5 03/15/2016    BILITOT 1.0 03/15/2016    ALKPHOS 75 03/15/2016    AST 19 03/15/2016    ALT 19 03/15/2016    AGRATIO 2.1 03/15/2016       No results found for: LABMICR, [id number]    No results found for: TSH, H5AJCFU, T4UVGUO, THYROIDAB, FT3, T4FREE    Lab Results   Component Value Date    WBC 8.8 07/21/2018    HGB 13.5 07/21/2018    HCT 40.8 07/21/2018    MCV 81.6 07/21/2018     07/21/2018       No results found for: PSA, PSADIA        There is no immunization history on file for this patient. Health Maintenance   Topic Date Due    COVID-19 Vaccine (1) Never done    Pneumococcal 0-64 years Vaccine (1 of 2 - PPSV23) Never done    DTaP/Tdap/Td vaccine (1 - Tdap) Never done    Colorectal Cancer Screen  Never done    Shingles Vaccine (1 of 2) Never done    A1C test (Diabetic or Prediabetic)  04/01/2017    Breast cancer screen  04/01/2018    Lipid screen  03/15/2021    Flu vaccine (1) Never done    Depression Screen  09/10/2022    Hepatitis C screen  Addressed    HIV screen  Addressed    Hepatitis A vaccine  Aged Out    Hepatitis B vaccine  Aged Out    Hib vaccine  Aged Out    Meningococcal (ACWY) vaccine  Aged Out         No future appointments. ASSESSMENT       Diagnosis Orders   1. Cough  XR CHEST (2 VW)    POCT COVID-19 Rapid, NAAT    POCT Influenza A/B   2. Chest congestion  XR CHEST (2 VW)    POCT COVID-19 Rapid, NAAT    POCT Influenza A/B   3. SOB (shortness of breath)  XR CHEST (2 VW)    POCT COVID-19 Rapid, NAAT    POCT Influenza A/B   4.  Sinus congestion         PLAN     Covid and influenza testing ordered  prednisone sent to pharmacy  Viral nature of symptoms discussed  Symptomatic Care  Increase fluids and rest  RTO if symptoms worsen or stay the same    19}    Pursuant to the emergency declaration under the [facility], [facility] waiver authority and the [employer] and [employer] Act, this Virtual  Visit was conducted, with patient's consent, to reduce the patient's risk of exposure to COVID-19 and provide continuity of care for an established patient. Services were provided through a video synchronous discussion virtually to substitute for in-person clinic visit. Electronically signed by NIKI White CNP on 2/17/2022 at 3:17 PM    Greater than 20 minutes was spent in contact with patient with greater than 50% in counseling and coordination of care.     Visit was completed as a telephone visit as patient's camera would not turn on

## 2022-02-17 NOTE — LETTER
[address] [address]  Phone: [phone]  Fax: [phone]    NIKI Wynn CNP        February 17, 2022     Patient: Jhoan Shoulder   YOB: 1960   Date of Visit: 2/17/2022       To Whom it May Concern:    Myriam Norris was seen in my clinic on 2/17/2022. She may return to work on 2/21/22. If you have any questions or concerns, please don't hesitate to call.     Sincerely,         NIKI Wynn CNP

## 2022-02-18 RX ORDER — PREDNISONE 20 MG/1
20 TABLET ORAL 2 TIMES DAILY
Qty: 10 TABLET | Refills: 0 | Status: SHIPPED | OUTPATIENT
Start: 2022-02-18 | End: 2022-02-23

## 2022-02-18 NOTE — PROGRESS NOTES
[facility] DR DESMOND DUGGAN did not receive rx yesterday. Sent again electronically. TS notified this being done.

## 2022-02-21 ENCOUNTER — TELEPHONE (OUTPATIENT)
Dept: FAMILY MEDICINE CLINIC | Age: 62
End: 2022-02-21

## 2022-02-21 ENCOUNTER — OFFICE VISIT (OUTPATIENT)
Dept: FAMILY MEDICINE CLINIC | Age: 62
End: 2022-02-21
Payer: COMMERCIAL

## 2022-02-21 VITALS
BODY MASS INDEX: 47.43 KG/M2 | OXYGEN SATURATION: 93 % | WEIGHT: 251 LBS | SYSTOLIC BLOOD PRESSURE: 142 MMHG | HEART RATE: 76 BPM | RESPIRATION RATE: 24 BRPM | DIASTOLIC BLOOD PRESSURE: 92 MMHG

## 2022-02-21 DIAGNOSIS — R06.02 SOB (SHORTNESS OF BREATH): ICD-10-CM

## 2022-02-21 DIAGNOSIS — J40 BRONCHITIS: Primary | ICD-10-CM

## 2022-02-21 PROCEDURE — 99213 OFFICE O/P EST LOW 20 MIN: CPT | Performed by: NURSE PRACTITIONER

## 2022-02-21 RX ORDER — AMOXICILLIN AND CLAVULANATE POTASSIUM 875; 125 MG/1; MG/1
1 TABLET, FILM COATED ORAL 2 TIMES DAILY
Qty: 20 TABLET | Refills: 0 | Status: SHIPPED | OUTPATIENT
Start: 2022-02-21 | End: 2022-03-03

## 2022-02-21 SDOH — ECONOMIC STABILITY: FOOD INSECURITY: WITHIN THE PAST 12 MONTHS, THE FOOD YOU BOUGHT JUST DIDN'T LAST AND YOU DIDN'T HAVE MONEY TO GET MORE.: NEVER TRUE

## 2022-02-21 SDOH — ECONOMIC STABILITY: FOOD INSECURITY: WITHIN THE PAST 12 MONTHS, YOU WORRIED THAT YOUR FOOD WOULD RUN OUT BEFORE YOU GOT MONEY TO BUY MORE.: NEVER TRUE

## 2022-02-21 ASSESSMENT — SOCIAL DETERMINANTS OF HEALTH (SDOH): HOW HARD IS IT FOR YOU TO PAY FOR THE VERY BASICS LIKE FOOD, HOUSING, MEDICAL CARE, AND HEATING?: NOT HARD AT ALL

## 2022-02-21 NOTE — PATIENT INSTRUCTIONS
Patient Education        Bronchitis: Care Instructions  Your Care Instructions     Bronchitis is inflammation of the bronchial tubes, which carry air to the lungs. The tubes swell and produce mucus, or phlegm. The mucus and inflamed bronchial tubes make you cough. You may have trouble breathing. Most cases of bronchitis are caused by viruses like those that cause colds. Antibiotics usually do not help and they may be harmful. Bronchitis usually develops rapidly and lasts about 2 to 3 weeks in otherwise healthy people. Follow-up care is a key part of your treatment and safety. Be sure to make and go to all appointments, and call your doctor if you are having problems. It's also a good idea to know your test results and keep a list of the medicines you take. How can you care for yourself at home? · Take all medicines exactly as prescribed. Call your doctor if you think you are having a problem with your medicine. · Get some extra rest.  · Take an over-the-counter pain medicine, such as acetaminophen (Tylenol), ibuprofen (Advil, Motrin), or naproxen (Aleve) to reduce fever and relieve body aches. Read and follow all instructions on the label. · Do not take two or more pain medicines at the same time unless the doctor told you to. Many pain medicines have acetaminophen, which is Tylenol. Too much acetaminophen (Tylenol) can be harmful. · Take an over-the-counter cough medicine to help quiet a dry, hacking cough so that you can sleep. Avoid cough medicines that have more than one active ingredient. Read and follow all instructions on the label. · Do not smoke. Smoking can make bronchitis worse. If you need help quitting, talk to your doctor about stop-smoking programs and medicines. These can increase your chances of quitting for good. When should you call for help? Call 911 anytime you think you may need emergency care. For example, call if:    · You have severe trouble breathing.    Call your doctor now or seek immediate medical care if:    · You have new or worse trouble breathing.     · You cough up dark brown or bloody mucus (sputum).     · You have a new or higher fever.     · You have a new rash. Watch closely for changes in your health, and be sure to contact your doctor if:    · You cough more deeply or more often, especially if you notice more mucus or a change in the color of your mucus.     · You are not getting better as expected. Where can you learn more? Go to https://[employer]". org and sign in to your [employer] account. Enter H333 in the Drimmi box to learn more about \"Bronchitis: Care Instructions. \"     If you do not have an account, please click on the \"Sign Up Now\" link. Current as of: July 6, 2021               Content Version: 13.1  © 3965-9381 Healthwise, Incorporated. Care instructions adapted under license by [facility] ([facility]). If you have questions about a medical condition or this instruction, always ask your healthcare professional. [address] any warranty or liability for your use of this information.

## 2022-02-21 NOTE — LETTER
[address] [place] [id number]  Phone: [phone]  Fax: [phone]    NIKI Vicente CNP        February 21, 2022     Patient: Tee Lopez   YOB: 1960   Date of Visit: 2/21/2022       To Whom it May Concern:    Christine Lindquist was seen in my clinic on 2/21/2022. She may return to work on 2/28/22. If you have any questions or concerns, please don't hesitate to call.     Sincerely,         NIKI Vicente CNP

## 2022-02-21 NOTE — TELEPHONE ENCOUNTER
Pt states that she is not feeling any better. 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 Pt states that she is wheezing and SOB. She states that she was unable to start her medication until Saturday. Completed CXR at [facility] about an hour ago (no results).  She is requesting aan appt and I scheduled her today at

## 2022-02-21 NOTE — PROGRESS NOTES
Chief Complaint   Patient presents with    Shortness of Breath     C/O SOB, coughing since last week. Having trouble walking without getting extremely out of breath. SOB has gotten worse said 2/17/22 VV         SUBJECTIVE     Zamzam Castle is a 59 y. o.female      Pt presents to office with her Son. She was placed on a steroid a few days ago for viral URI but states symptoms are worsening. She is having lots of wheezing, cough, and sob. She does smoke cigarettes but has decreased since being ill. Denies fever. CXR completed today is negative. Review of Systems   Constitutional: Positive for activity change, appetite change and fatigue. Negative for chills, diaphoresis and fever. HENT: Positive for congestion. Respiratory: Positive for cough, shortness of breath and wheezing. Cardiovascular: Negative for chest pain, palpitations and leg swelling. Gastrointestinal: Negative for blood in stool, constipation, diarrhea, nausea and vomiting. Genitourinary: Negative for dysuria and hematuria. Musculoskeletal: Negative for myalgias. Neurological: Negative for dizziness and headaches. All other systems reviewed and are negative. OBJECTIVE     BP (!) 142/92   Pulse 76   Resp 24   Wt 251 lb (113.9 kg)   SpO2 93%   BMI 47.43 kg/m²     Physical Exam  Vitals and nursing note reviewed. Constitutional:       General: She is not in acute distress. Appearance: She is well-developed. She is obese. She is ill-appearing. She is not toxic-appearing. HENT:      Head: Normocephalic and atraumatic. Right Ear: External ear normal.      Left Ear: External ear normal.      Nose: Nose normal.   Eyes:      Conjunctiva/sclera: Conjunctivae normal.      Pupils: Pupils are equal, round, and reactive to light. Cardiovascular:      Rate and Rhythm: Normal rate and regular rhythm. Heart sounds: Normal heart sounds.    Pulmonary:      Effort: Pulmonary effort is normal.      Breath sounds: Wheezing IMPRESSION:     No acute cardiopulmonary process.          Electronically Signed:     Abbey Brown MD     2022/02/21 at 15:49 EST     Reading Location ID and State: [address] [place] [address]     Tel [phone], Service support  [phone], Fax [phone]                         cc: Timothy Palencia MD; Richardson Ricardo CNP               Dictated by: Piyush Brown MD on 02/21/22 9612     Technologist:   RT(R)(M) Forrestine Sacks     Transcribed by: Flavia Desai on 02/21/22 4502          Report Signed by: ALEXYS Núñez MD on 02/21/22 3808       No results found for this visit on 02/21/22. ASSESSMENT       Diagnosis Orders   1. Bronchitis     2.  SOB (shortness of breath)         PLAN     Requested Prescriptions     Signed Prescriptions Disp Refills    amoxicillin-clavulanate (AUGMENTIN) 875-125 MG per tablet 20 tablet 0     Sig: Take 1 tablet by mouth 2 times daily for 10 days       Augmentin sent to pharmacy  Increase fluids and rest  FInish steroid  Notify office if symptoms worsen          Electronically signed by NIKI Veliz CNP on 2/25/2022 at 1:40 PM

## 2022-02-25 ASSESSMENT — ENCOUNTER SYMPTOMS
CONSTIPATION: 0
WHEEZING: 1
COUGH: 1
NAUSEA: 0
DIARRHEA: 0
SHORTNESS OF BREATH: 1
VOMITING: 0
BLOOD IN STOOL: 0

## 2023-02-17 ENCOUNTER — HOSPITAL ENCOUNTER (EMERGENCY)
Age: 63
Discharge: HOME | End: 2023-02-17
Payer: COMMERCIAL

## 2023-02-17 VITALS
OXYGEN SATURATION: 97 % | BODY MASS INDEX: 49.13 KG/M2 | HEART RATE: 74 BPM | TEMPERATURE: 98.3 F | WEIGHT: 260 LBS | DIASTOLIC BLOOD PRESSURE: 98 MMHG | SYSTOLIC BLOOD PRESSURE: 178 MMHG | RESPIRATION RATE: 20 BRPM

## 2023-02-17 DIAGNOSIS — J01.90 ACUTE NON-RECURRENT SINUSITIS, UNSPECIFIED LOCATION: Primary | ICD-10-CM

## 2023-02-17 DIAGNOSIS — R03.0 ELEVATED BLOOD PRESSURE READING: ICD-10-CM

## 2023-02-17 LAB
EKG ATRIAL RATE: 84 BPM
EKG P AXIS: 65 DEGREES
EKG P-R INTERVAL: 182 MS
EKG Q-T INTERVAL: 374 MS
EKG QRS DURATION: 80 MS
EKG QTC CALCULATION (BAZETT): 441 MS
EKG R AXIS: 20 DEGREES
EKG T AXIS: 36 DEGREES
EKG VENTRICULAR RATE: 84 BPM

## 2023-02-17 PROCEDURE — 93005 ELECTROCARDIOGRAM TRACING: CPT | Performed by: NURSE PRACTITIONER

## 2023-02-17 PROCEDURE — 99213 OFFICE O/P EST LOW 20 MIN: CPT

## 2023-02-17 PROCEDURE — 93010 ELECTROCARDIOGRAM REPORT: CPT | Performed by: INTERNAL MEDICINE

## 2023-02-17 PROCEDURE — 99213 OFFICE O/P EST LOW 20 MIN: CPT | Performed by: NURSE PRACTITIONER

## 2023-02-17 RX ORDER — GREEN TEA/HOODIA GORDONII 315-12.5MG
1 CAPSULE ORAL DAILY
Qty: 14 TABLET | Refills: 0 | COMMUNITY
Start: 2023-02-17

## 2023-02-17 RX ORDER — AMOXICILLIN AND CLAVULANATE POTASSIUM 875; 125 MG/1; MG/1
1 TABLET, FILM COATED ORAL 2 TIMES DAILY
Qty: 20 TABLET | Refills: 0 | Status: SHIPPED | OUTPATIENT
Start: 2023-02-17 | End: 2023-02-27

## 2023-02-17 ASSESSMENT — PAIN - FUNCTIONAL ASSESSMENT: PAIN_FUNCTIONAL_ASSESSMENT: NONE - DENIES PAIN

## 2023-02-17 ASSESSMENT — ENCOUNTER SYMPTOMS
TROUBLE SWALLOWING: 0
SINUS PAIN: 1
SHORTNESS OF BREATH: 0
NAUSEA: 0
SORE THROAT: 0
EYE REDNESS: 0
RHINORRHEA: 1
COUGH: 0
SINUS PRESSURE: 1
EYE DISCHARGE: 0
VOMITING: 0
DIARRHEA: 0

## 2023-02-17 NOTE — Clinical Note
Candy Curiel was seen and treated in our emergency department on 2/17/2023. She may return to work on 02/19/2023. If you have any questions or concerns, please don't hesitate to call.       Deirdre Stone, APRN - CNP

## 2023-02-17 NOTE — PROGRESS NOTES
Manual blood pressure obtained. Patient states head congestion and sinus pressure beginning one week ago. Denies use of decongestants.

## 2023-02-17 NOTE — ED TRIAGE NOTES
Patient to room from registration. C/o intermittent dizziness (increased with head movement) and headache beginning one week ago. States elevated blood pressure reading while at work this morning. Denies chest pain. Denies increased shortness of breath. EKG completed.

## 2023-02-17 NOTE — ED PROVIDER NOTES
[place] [address]  Urgent Care Encounter      CHIEF COMPLAINT       Chief Complaint   Patient presents with    Headache     Dizziness      Hypertension       Nurses Notes reviewed and I agree except as noted in the HPI. HISTORY OF PRESENT ILLNESS   Zeus Keller is a 57 y.o. female who presents for evaluation of sinus problems and elevated blood pressure. Patient complains of sinus congestion with pressure/headache for the past week. Associated dizziness with certain positions. No syncope, chest pain, palpitations, shortness of breath. No improvement with current treatment. Patient notes taking DayQuil sporadically. Patient also complains of elevated blood pressure. History of elevated blood pressure in the past without diagnosis/treatment for hypertension. Denies chest pain, palpitations. No recent travel. No recent procedures. No additional complaints. REVIEW OF SYSTEMS     Review of Systems   Constitutional:  Negative for chills, diaphoresis, fatigue and fever. HENT:  Positive for congestion, postnasal drip, rhinorrhea, sinus pressure and sinus pain. Negative for ear pain, sore throat and trouble swallowing. Eyes:  Negative for discharge and redness. Respiratory:  Negative for cough and shortness of breath. Cardiovascular:  Negative for chest pain and palpitations. Gastrointestinal:  Negative for diarrhea, nausea and vomiting. Genitourinary:  Negative for decreased urine volume. Musculoskeletal:  Negative for neck pain and neck stiffness. Skin:  Negative for rash. Neurological:  Positive for headaches. Hematological:  Negative for adenopathy. Psychiatric/Behavioral:  Negative for sleep disturbance.       PAST MEDICAL HISTORY         Diagnosis Date    Hyperlipidemia 4/1/2016    IFG (impaired fasting glucose) 4/1/2016    Morbid obesity ([address] 3/11/2015       SURGICAL HISTORY     Patient  has a past surgical history that includes Rotator cuff repair ();  section (, ); Hysterectomy (~); and Septoplasty (2016). CURRENT MEDICATIONS       Discharge Medication List as of 2023 11:09 AM        CONTINUE these medications which have NOT CHANGED    Details   albuterol (PROVENTIL) (2.5 MG/3ML) 0.083% nebulizer solution USE 1 VIAL IN NEBULIZER THREE TIMES DAILY AS NEEDED, Disp-25 each, R-2Normal      ibuprofen (ADVIL;MOTRIN) 200 MG tablet Take 200 mg by mouth every 6 hours as needed for Pain Taking 3 tabs             ALLERGIES     Patient is has No Known Allergies. FAMILY HISTORY     Patient'sfamily history includes Cancer in her father and mother; Diabetes in her father; Heart Disease in her maternal grandfather, maternal grandmother, paternal grandfather, and paternal grandmother; High Blood Pressure in her maternal grandfather, maternal grandmother, paternal grandfather, and paternal grandmother. SOCIAL HISTORY     Patient  reports that she has been smoking cigarettes. She has a 17.50 pack-year smoking history. She has never used smokeless tobacco. She reports that she does not currently use alcohol. She reports that she does not use drugs. PHYSICAL EXAM     ED TRIAGE VITALS  BP: (!) 178/98, Temp: 98.3 °F (36.8 °C), Heart Rate: 74, Resp: 20, SpO2: 97 %  Physical Exam  Vitals and nursing note reviewed. Constitutional:       General: She is not in acute distress. Appearance: Normal appearance. She is well-developed. She is not ill-appearing, toxic-appearing or diaphoretic. HENT:      Head: Normocephalic and atraumatic. Right Ear: Hearing, tympanic membrane, ear canal and external ear normal. No mastoid tenderness. No hemotympanum. Tympanic membrane is not perforated, erythematous or bulging. Left Ear: Hearing, tympanic membrane, ear canal and external ear normal. No mastoid tenderness. No hemotympanum. Tympanic membrane is not perforated, erythematous or bulging. Nose: Mucosal edema and congestion present. Mouth/Throat:      Mouth: Mucous membranes are moist.      Pharynx: Oropharynx is clear. Uvula midline. Tonsils: No tonsillar abscesses. Eyes:      General: Lids are normal. No scleral icterus. Extraocular Movements: Extraocular movements intact. Conjunctiva/sclera: Conjunctivae normal.      Right eye: Right conjunctiva is not injected. No hemorrhage. Left eye: Left conjunctiva is not injected. No hemorrhage. Pupils: Pupils are equal, round, and reactive to light. Neck:      Thyroid: No thyromegaly. Trachea: Trachea normal.   Cardiovascular:      Rate and Rhythm: Normal rate and regular rhythm. No extrasystoles are present. Chest Wall: PMI is not displaced. Heart sounds: Normal heart sounds. No murmur heard. No friction rub. No gallop. Pulmonary:      Effort: Pulmonary effort is normal. No respiratory distress. Breath sounds: Normal breath sounds. Musculoskeletal:      Cervical back: Normal range of motion and neck supple. Lymphadenopathy:      Head:      Right side of head: No submental, submandibular, tonsillar or occipital adenopathy. Left side of head: No submental, submandibular, tonsillar or occipital adenopathy. Cervical: No cervical adenopathy. Upper Body:      Right upper body: No supraclavicular adenopathy. Left upper body: No supraclavicular adenopathy. Skin:     General: Skin is warm and dry. Capillary Refill: Capillary refill takes less than 2 seconds. Coloration: Skin is not pale. Findings: No rash. Comments: Skin warm and dry to touch, no rashes noted on exposed surfaces. Neurological:      Mental Status: She is alert and oriented to person, place, and time. She is not disoriented. Cranial Nerves: Cranial nerves 2-12 are intact. Gait: Gait is intact. Psychiatric:         Mood and Affect: Mood normal.         Behavior: Behavior is cooperative.        DIAGNOSTIC RESULTS   Labs:  Results for orders placed or performed during the hospital encounter of 02/17/23   EKG 12 Lead   Result Value Ref Range    Ventricular Rate 84 BPM    Atrial Rate 84 BPM    P-R Interval 182 ms    QRS Duration 80 ms    Q-T Interval 374 ms    QTc Calculation (Bazett) 441 ms    P Axis 65 degrees    R Axis 20 degrees    T Axis 36 degrees       IMAGING:  No orders to display      URGENT CARE COURSE:     Vitals:    02/17/23 1021 02/17/23 1027 02/17/23 1031   BP: (!) 219/109 (!) 182/101 (!) 178/98   Pulse: 74     Resp: 20     Temp: 98.3 °F (36.8 °C)     TempSrc: Temporal     SpO2: 97%     Weight: 260 lb (117.9 kg)         Medications - No data to display  PROCEDURES:  None  FINAL IMPRESSION      1. Acute non-recurrent sinusitis, unspecified location    2. Elevated blood pressure reading        DISPOSITION/PLAN   DISPOSITION Decision To Discharge 02/17/2023 11:05:13 AM    Nontoxic, no distress. EKG normal sinus rhythm. Patient presents with elevated blood pressure. Advised to monitor, follow-up with PCP for evaluation/treatment. She also presents with worsening sinus congestion/headache over the past week. Treat congestion locally. Medication as prescribed. Daily yogurt/probiotic. If symptoms worsen go to ER. PATIENT REFERRED TO:  NIKI Dolan CNP  [address] [place] [address]  [phone]      Call PCP regarding blood pressure. Medication as prescribed. Probiotic OTC. Vapor Inhaler nasal decongestant OTC. Increas water intake. If worse go to ER.   DISCHARGE MEDICATIONS:  Discharge Medication List as of 2/17/2023 11:09 AM        START taking these medications    Details   amoxicillin-clavulanate (AUGMENTIN) 875-125 MG per tablet Take 1 tablet by mouth 2 times daily for 10 days, Disp-20 tablet, R-0Normal      Probiotic Acidophilus (FLORANEX) TABS Take 1 tablet by mouth daily, Disp-14 tablet, R-0OTC           Discharge Medication List as of 2/17/2023 11:09 AM          NIKI Dunbar CNP Ha Gan, NIKI - CNP  02/17/23 1414

## 2023-02-20 ENCOUNTER — TELEPHONE (OUTPATIENT)
Dept: FAMILY MEDICINE CLINIC | Age: 63
End: 2023-02-20

## 2023-02-20 NOTE — LETTER
[address] [facility]  [address] [facility] [place], [address]  Phone: [phone]  Fax: [phone]    February 20, 2023    [address] [facility] [address] [address]    Dear Chestine Barthel,    Thank you for choosing our [facility] on 2/17/23. Dr. Yanira Selby wanted to make sure that you understand your discharge instructions and that you were able to fill any prescriptions that may have been ordered for you. Please contact the office at the above phone number if you were advised to follow up with Dr. Yanira Selby, or if you have any further questions or needs. Also, did you know -                             [facility]) practices can often offer you an appointment on the same day that you call for acute issues. *We have some [facility] offices that offer Walk-in appointments; check our website for availability in your community, [email or web address].      *Evisits are now available for patients through [address] [facility]) also offers video visits through [address] If you do not have MyChart and are interested, please contact the office and a staff member may assist you or go to [email or web address].     Sincerely,   NIKI Akins CNP and your [facility]

## 2023-02-21 ENCOUNTER — OFFICE VISIT (OUTPATIENT)
Dept: FAMILY MEDICINE CLINIC | Age: 63
End: 2023-02-21

## 2023-02-21 VITALS
SYSTOLIC BLOOD PRESSURE: 138 MMHG | BODY MASS INDEX: 47.46 KG/M2 | HEIGHT: 61 IN | DIASTOLIC BLOOD PRESSURE: 86 MMHG | RESPIRATION RATE: 16 BRPM | HEART RATE: 84 BPM | WEIGHT: 251.4 LBS

## 2023-02-21 DIAGNOSIS — Z13.220 SCREENING, LIPID: ICD-10-CM

## 2023-02-21 DIAGNOSIS — R73.01 IFG (IMPAIRED FASTING GLUCOSE): Primary | ICD-10-CM

## 2023-02-21 DIAGNOSIS — R03.0 ELEVATED BLOOD PRESSURE READING: ICD-10-CM

## 2023-02-21 DIAGNOSIS — Z13.1 SCREENING FOR DIABETES MELLITUS: ICD-10-CM

## 2023-02-21 DIAGNOSIS — E66.01 MORBID OBESITY (HCC): ICD-10-CM

## 2023-02-21 DIAGNOSIS — R39.15 URGENCY OF URINATION: ICD-10-CM

## 2023-02-21 DIAGNOSIS — E78.5 HYPERLIPIDEMIA, UNSPECIFIED HYPERLIPIDEMIA TYPE: ICD-10-CM

## 2023-02-21 LAB
BILIRUBIN URINE: NEGATIVE
BLOOD URINE, POC: NEGATIVE
CHARACTER, URINE: CLEAR
COLOR, URINE: YELLOW
GLUCOSE URINE: NEGATIVE MG/DL
KETONES, URINE: NEGATIVE
LEUKOCYTE CLUMPS, URINE: NEGATIVE
NITRITE, URINE: NEGATIVE
PH, URINE: 5.5 (ref 5–9)
PROTEIN, URINE: NEGATIVE MG/DL
SPECIFIC GRAVITY, URINE: 1.02 (ref 1–1.03)
UROBILINOGEN, URINE: 0.2 EU/DL (ref 0–1)

## 2023-02-21 RX ORDER — ASPIRIN 81 MG/1
81 TABLET ORAL DAILY
COMMUNITY

## 2023-02-21 SDOH — ECONOMIC STABILITY: INCOME INSECURITY: HOW HARD IS IT FOR YOU TO PAY FOR THE VERY BASICS LIKE FOOD, HOUSING, MEDICAL CARE, AND HEATING?: NOT HARD AT ALL

## 2023-02-21 SDOH — ECONOMIC STABILITY: FOOD INSECURITY: WITHIN THE PAST 12 MONTHS, THE FOOD YOU BOUGHT JUST DIDN'T LAST AND YOU DIDN'T HAVE MONEY TO GET MORE.: NEVER TRUE

## 2023-02-21 SDOH — ECONOMIC STABILITY: HOUSING INSECURITY
IN THE LAST 12 MONTHS, WAS THERE A TIME WHEN YOU DID NOT HAVE A STEADY PLACE TO SLEEP OR SLEPT IN A SHELTER (INCLUDING NOW)?: NO

## 2023-02-21 SDOH — ECONOMIC STABILITY: FOOD INSECURITY: WITHIN THE PAST 12 MONTHS, YOU WORRIED THAT YOUR FOOD WOULD RUN OUT BEFORE YOU GOT MONEY TO BUY MORE.: NEVER TRUE

## 2023-02-21 ASSESSMENT — PATIENT HEALTH QUESTIONNAIRE - PHQ9
SUM OF ALL RESPONSES TO PHQ QUESTIONS 1-9: 0
1. LITTLE INTEREST OR PLEASURE IN DOING THINGS: 0
SUM OF ALL RESPONSES TO PHQ QUESTIONS 1-9: 0
SUM OF ALL RESPONSES TO PHQ9 QUESTIONS 1 & 2: 0
2. FEELING DOWN, DEPRESSED OR HOPELESS: 0

## 2023-02-21 NOTE — PROGRESS NOTES
Chief Complaint   Patient presents with    Follow-up     Elevated BP, gradually worsened headache that does get better with OTC meds,        SUBJECTIVE     Gisell Lanza is a 57 y. o.female      Pt presents for follow up after UC visit for sinusitis and HTN. She was placed on Augmentin and told to follow up with her PCP for her BP. Pt states she has been told before that it is high. Notes some headaches but denies chest pain or sob. Pt c/o urgency for several years since she had her hysterectomy. Review of Systems   Constitutional:  Negative for chills, diaphoresis and fever. Respiratory:  Negative for shortness of breath. Cardiovascular:  Negative for chest pain, palpitations and leg swelling. Gastrointestinal:  Negative for blood in stool, constipation, diarrhea, nausea and vomiting. Genitourinary:  Positive for urgency. Negative for dysuria and hematuria. Musculoskeletal:  Negative for myalgias. Neurological:  Positive for headaches. Negative for dizziness. All other systems reviewed and are negative. OBJECTIVE     /86   Pulse 84   Resp 16   Ht 5' 1\" (1.549 m)   Wt 251 lb 6.4 oz (114 kg)   BMI 47.50 kg/m²     Physical Exam  Vitals and nursing note reviewed. Constitutional:       Appearance: She is well-developed. She is obese. HENT:      Head: Normocephalic and atraumatic. Right Ear: External ear normal.      Left Ear: External ear normal.      Nose: Nose normal.   Eyes:      Conjunctiva/sclera: Conjunctivae normal.      Pupils: Pupils are equal, round, and reactive to light. Cardiovascular:      Rate and Rhythm: Normal rate and regular rhythm. Heart sounds: Normal heart sounds. Pulmonary:      Effort: Pulmonary effort is normal.      Breath sounds: Normal breath sounds. Abdominal:      General: Bowel sounds are normal.      Palpations: Abdomen is soft. Musculoskeletal:         General: Normal range of motion.       Cervical back: Normal range of motion and neck supple. Skin:     General: Skin is warm and dry. Neurological:      Mental Status: She is alert and oriented to person, place, and time. Deep Tendon Reflexes: Reflexes are normal and symmetric. Psychiatric:         Behavior: Behavior normal.         Thought Content: Thought content normal.         Judgment: Judgment normal.         Results for POC orders placed in visit on 02/21/23   POCT Urinalysis No Micro (Auto)   Result Value Ref Range    Glucose, Ur Negative NEGATIVE mg/dl    Bilirubin Urine Negative     Ketones, Urine Negative NEGATIVE    Specific Gravity, Urine 1.020 1.002 - 1.030    Blood, UA POC Negative NEGATIVE    pH, Urine 5.50 5.0 - 9.0    Protein, Urine Negative NEGATIVE mg/dl    Urobilinogen, Urine 0.20 0.0 - 1.0 eu/dl    Nitrite, Urine Negative NEGATIVE    Leukocyte Clumps, Urine Negative NEGATIVE    Color, Urine Yellow [id number]-STRAW    Character, Urine Clear [id number]         ASSESSMENT       Diagnosis Orders   1. IFG (impaired fasting glucose)  T4, Free    TSH    Hemoglobin A1C    CBC with Auto Differential    Microalbumin / Creatinine Urine Ratio      2. Morbid obesity (HCC)  T4, Free    TSH    CBC with Auto Differential      3. Hyperlipidemia, unspecified hyperlipidemia type        4. Screening, lipid  Lipid Panel      5. Screening for diabetes mellitus  Comprehensive Metabolic Panel      6. Elevated blood pressure reading  T4, Free    TSH    CBC with Auto Differential    Microalbumin / Creatinine Urine Ratio      7.  Urgency of urination  POCT Urinalysis No Micro (Auto)          PLAN     Orders Placed This Encounter   Procedures    Comprehensive Metabolic Panel     Standing Status:   Future     Standing Expiration Date:   2/21/2024    Lipid Panel     Standing Status:   Future     Standing Expiration Date:   2/21/2024     Order Specific Question:   Is Patient Fasting?/# of Hours     Answer:   yes/12    T4, Free     Standing Status:   Future     Standing Expiration Date: 2/21/2024    TSH     Standing Status:   Future     Standing Expiration Date:   2/21/2024    Hemoglobin A1C     Standing Status:   Future     Standing Expiration Date:   2/21/2024    CBC with Auto Differential     Standing Status:   Future     Standing Expiration Date:   2/21/2024    Microalbumin / Creatinine Urine Ratio     Standing Status:   Future     Standing Expiration Date:   2/21/2024    POCT Urinalysis No Micro (Auto)     Monitor home BP for 2-4 weeks and follow up in 4 weeks  Labs as above  Urine today is unremarkable  Discussed pelvic floor therapy and medications for urinary incontinence. Pt would like to hold. Follow up in 2-4 weeks          Electronically signed by NIKI Navarro CNP on 2/26/2023 at 10:22 PM  Greater than 30 minutes was spent in face-to face contact with patient with greater than 50% in counseling and coordination of care.

## 2023-02-22 ASSESSMENT — ENCOUNTER SYMPTOMS
DIARRHEA: 0
NAUSEA: 0
CONSTIPATION: 0
VOMITING: 0
BLOOD IN STOOL: 0
SHORTNESS OF BREATH: 0

## 2023-03-27 ENCOUNTER — OFFICE VISIT (OUTPATIENT)
Dept: FAMILY MEDICINE CLINIC | Age: 63
End: 2023-03-27
Payer: COMMERCIAL

## 2023-03-27 VITALS
BODY MASS INDEX: 47.5 KG/M2 | SYSTOLIC BLOOD PRESSURE: 154 MMHG | TEMPERATURE: 98.4 F | WEIGHT: 251.4 LBS | DIASTOLIC BLOOD PRESSURE: 74 MMHG | HEART RATE: 72 BPM | RESPIRATION RATE: 20 BRPM

## 2023-03-27 DIAGNOSIS — E03.8 SUBCLINICAL HYPOTHYROIDISM: Primary | ICD-10-CM

## 2023-03-27 DIAGNOSIS — R06.02 SHORTNESS OF BREATH: ICD-10-CM

## 2023-03-27 DIAGNOSIS — I10 PRIMARY HYPERTENSION: ICD-10-CM

## 2023-03-27 DIAGNOSIS — Z72.0 TOBACCO ABUSE: ICD-10-CM

## 2023-03-27 DIAGNOSIS — R05.9 COUGH, UNSPECIFIED TYPE: ICD-10-CM

## 2023-03-27 DIAGNOSIS — E78.00 HIGH CHOLESTEROL: ICD-10-CM

## 2023-03-27 PROCEDURE — 3078F DIAST BP <80 MM HG: CPT | Performed by: NURSE PRACTITIONER

## 2023-03-27 PROCEDURE — 3074F SYST BP LT 130 MM HG: CPT | Performed by: NURSE PRACTITIONER

## 2023-03-27 PROCEDURE — 99214 OFFICE O/P EST MOD 30 MIN: CPT | Performed by: NURSE PRACTITIONER

## 2023-03-27 RX ORDER — LISINOPRIL 10 MG/1
10 TABLET ORAL DAILY
Qty: 30 TABLET | Refills: 2 | Status: SHIPPED | OUTPATIENT
Start: 2023-03-27

## 2023-03-27 RX ORDER — ALBUTEROL SULFATE 2.5 MG/3ML
SOLUTION RESPIRATORY (INHALATION)
Qty: 25 EACH | Refills: 2 | Status: SHIPPED | OUTPATIENT
Start: 2023-03-27

## 2023-03-27 NOTE — PROGRESS NOTES
Chief Complaint   Patient presents with    1 Month Follow-Up     1 month f/u for blood pressure. Log scanned and attached. Ongoing fatigue. No headaches. Did mention that b/p will elevate more going from sitting to standing. Labs done, results in epic. Catherine Sanders is a 57 y. o.female      Pt presents for follow up of elevated BP. States that she has had fatigue but unsure if this is related to work or something else. Denies CP, headaches, dizziness. She does have ongoing sob. +smoker. Review of Systems   Constitutional:  Positive for fatigue. Negative for chills, diaphoresis and fever. Respiratory:  Positive for cough and shortness of breath. Cardiovascular:  Negative for chest pain, palpitations and leg swelling. Gastrointestinal:  Negative for blood in stool, constipation, diarrhea, nausea and vomiting. Genitourinary:  Negative for dysuria and hematuria. Musculoskeletal:  Negative for myalgias. Neurological:  Negative for dizziness and headaches. All other systems reviewed and are negative. OBJECTIVE     BP (!) 154/74 (Site: Right Lower Arm)   Pulse 72   Temp 98.4 °F (36.9 °C) (Oral)   Resp 20   Wt 251 lb 6.4 oz (114 kg)   BMI 47.50 kg/m²     Physical Exam  Vitals and nursing note reviewed. Constitutional:       Appearance: She is well-developed. She is obese. HENT:      Head: Normocephalic and atraumatic. Right Ear: External ear normal.      Left Ear: External ear normal.      Nose: Nose normal.   Eyes:      Conjunctiva/sclera: Conjunctivae normal.      Pupils: Pupils are equal, round, and reactive to light. Cardiovascular:      Rate and Rhythm: Normal rate and regular rhythm. Heart sounds: Normal heart sounds. Pulmonary:      Effort: Pulmonary effort is normal.      Breath sounds: Normal breath sounds. Abdominal:      General: Bowel sounds are normal.      Palpations: Abdomen is soft.    Musculoskeletal:         General: Normal range

## 2023-03-28 ASSESSMENT — ENCOUNTER SYMPTOMS
COUGH: 1
NAUSEA: 0
BLOOD IN STOOL: 0
VOMITING: 0
CONSTIPATION: 0
SHORTNESS OF BREATH: 1
DIARRHEA: 0

## 2023-04-04 ENCOUNTER — TELEPHONE (OUTPATIENT)
Dept: FAMILY MEDICINE CLINIC | Age: 63
End: 2023-04-04

## 2023-04-04 RX ORDER — LOSARTAN POTASSIUM 50 MG/1
50 TABLET ORAL DAILY
Qty: 30 TABLET | Refills: 2 | Status: SHIPPED | OUTPATIENT
Start: 2023-04-04

## 2023-04-04 NOTE — TELEPHONE ENCOUNTER
Spoke to pt and notified her to stop the Lisinopril and start Losartan. She was notified the Rx was sent to the pharmacy.

## 2023-04-04 NOTE — TELEPHONE ENCOUNTER
----- Message from Hermila Darden sent at 4/4/2023 10:59 AM EDT -----  Subject: Message to Provider    QUESTIONS  Information for Provider? Pt calling to report that she is up all night   coughing due to the BP medication that has been RXed. Please call with   instructions.  ---------------------------------------------------------------------------  --------------  Valery Hirsch INFO  [phone]; OK to leave message on voicemail  ---------------------------------------------------------------------------  --------------  SCRIPT ANSWERS  Relationship to Patient?  Self

## 2023-05-08 ENCOUNTER — OFFICE VISIT (OUTPATIENT)
Dept: FAMILY MEDICINE CLINIC | Age: 63
End: 2023-05-08
Payer: COMMERCIAL

## 2023-05-08 VITALS
HEIGHT: 61 IN | DIASTOLIC BLOOD PRESSURE: 82 MMHG | SYSTOLIC BLOOD PRESSURE: 128 MMHG | OXYGEN SATURATION: 93 % | HEART RATE: 87 BPM | WEIGHT: 247 LBS | BODY MASS INDEX: 46.63 KG/M2

## 2023-05-08 DIAGNOSIS — Z12.11 COLON CANCER SCREENING: ICD-10-CM

## 2023-05-08 DIAGNOSIS — E03.8 SUBCLINICAL HYPOTHYROIDISM: Primary | ICD-10-CM

## 2023-05-08 PROCEDURE — 99213 OFFICE O/P EST LOW 20 MIN: CPT | Performed by: NURSE PRACTITIONER

## 2023-05-08 NOTE — PROGRESS NOTES
[facility]  [address] [id number]  Dept: [phone]  Loc: [phone]      Visit Date: 5/8/2023    Starr Osullivan a 57 y.o. female who presents today for:   Chief Complaint   Patient presents with    Medication Check     Started synthroid 4/27     HPI:     Just started on Synthroid 04/26/2023 - TSH was elevated - was not always taking it at the same time daily - was having some nausea in the morning but was drinking a full glass of water with the synthroid. Needs a handicap placard -walking long distances without stopping to rest.    HPI  Health Maintenance   Topic Date Due    Colorectal Cancer Screen  Never done    Breast cancer screen  04/01/2018    DTaP/Tdap/Td vaccine (1 - Tdap) 05/08/2024 (Originally 10/30/1979)    Shingles vaccine (1 of 2) 05/08/2024 (Originally 10/30/2008)    Pneumococcal 0-64 years Vaccine (1 - PCV) 05/08/2024 (Originally 10/30/1966)    COVID-19 Vaccine (1) 05/08/2028 (Originally 4/30/1961)    Flu vaccine (Season Ended) 08/01/2023    Depression Screen  02/21/2024    A1C test (Diabetic or Prediabetic)  03/01/2024    Lipids  03/01/2028    Hepatitis C screen  Addressed    HIV screen  Addressed    Hepatitis A vaccine  Aged Out    Hib vaccine  Aged Out    Meningococcal (ACWY) vaccine  Aged Out       Current Outpatient Medications   Medication Sig Dispense Refill    levothyroxine (SYNTHROID) 25 MCG tablet Take 1 tablet by mouth daily 30 tablet 3    losartan (COZAAR) 50 MG tablet Take 1 tablet by mouth daily 30 tablet 2    albuterol (PROVENTIL) (2.5 MG/3ML) 0.083% nebulizer solution USE 1 VIAL IN NEBULIZER THREE TIMES DAILY AS NEEDED 25 each 2    ibuprofen (ADVIL;MOTRIN) 200 MG tablet Take 1 tablet by mouth every 6 hours as needed for Pain Taking 3 tabs      aspirin 81 MG EC tablet Take 81 mg by mouth daily (Patient not taking: Reported on 5/8/2023)       No current facility-administered medications for this visit.

## 2023-07-03 RX ORDER — LOSARTAN POTASSIUM 50 MG/1
50 TABLET ORAL DAILY
Qty: 90 TABLET | Refills: 1 | Status: SHIPPED | OUTPATIENT
Start: 2023-07-03

## 2023-08-01 ENCOUNTER — TELEPHONE (OUTPATIENT)
Dept: FAMILY MEDICINE CLINIC | Age: 63
End: 2023-08-01

## 2023-08-01 DIAGNOSIS — E03.8 SUBCLINICAL HYPOTHYROIDISM: ICD-10-CM

## 2023-08-01 RX ORDER — LEVOTHYROXINE SODIUM 0.05 MG/1
50 TABLET ORAL DAILY
Qty: 30 TABLET | Refills: 5 | Status: SHIPPED | OUTPATIENT
Start: 2023-08-01

## 2023-08-01 NOTE — TELEPHONE ENCOUNTER
----- Message from NIKI Bang - CNP sent at 8/1/2023 11:16 AM EDT -----  TSH is wnl - synthroid refill sent

## 2023-08-02 NOTE — TELEPHONE ENCOUNTER
Goal Outcome Evaluation:         To Do:  End of Shift Summary  For vital signs and complete assessments, please see documentation flowsheets.     Pertinent assessments: Pt A/Ox4. VSS. Denies pain, nausea and SOB. No bleeding reported.  Insulin pump dressing CDI. BG 99 and 168.Up ind  Major Shift Events: none  Treatment Plan: Decadron IV, insulin pump, monitor platelet ct and bleeding.   Bedside Nurse: Jayla Watters RN                     Pt notified and verbalized understanding.

## 2023-09-20 ENCOUNTER — OFFICE VISIT (OUTPATIENT)
Dept: FAMILY MEDICINE CLINIC | Age: 63
End: 2023-09-20
Payer: COMMERCIAL

## 2023-09-20 VITALS
DIASTOLIC BLOOD PRESSURE: 74 MMHG | WEIGHT: 248 LBS | OXYGEN SATURATION: 94 % | RESPIRATION RATE: 18 BRPM | BODY MASS INDEX: 46.86 KG/M2 | HEART RATE: 72 BPM | TEMPERATURE: 98.1 F | SYSTOLIC BLOOD PRESSURE: 126 MMHG

## 2023-09-20 DIAGNOSIS — U07.1 COVID-19: Primary | ICD-10-CM

## 2023-09-20 PROCEDURE — 99213 OFFICE O/P EST LOW 20 MIN: CPT | Performed by: NURSE PRACTITIONER

## 2023-09-20 RX ORDER — DEXTROMETHORPHAN HYDROBROMIDE AND PROMETHAZINE HYDROCHLORIDE 15; 6.25 MG/5ML; MG/5ML
5 SYRUP ORAL 4 TIMES DAILY PRN
Qty: 118 ML | Refills: 0 | Status: SHIPPED | OUTPATIENT
Start: 2023-09-20 | End: 2023-09-27

## 2023-09-20 ASSESSMENT — ENCOUNTER SYMPTOMS
DIARRHEA: 0
NAUSEA: 0
BLOOD IN STOOL: 0
CONSTIPATION: 0
COUGH: 1
SHORTNESS OF BREATH: 0
WHEEZING: 1
RHINORRHEA: 1
VOMITING: 0

## 2023-10-25 ENCOUNTER — TELEPHONE (OUTPATIENT)
Dept: FAMILY MEDICINE CLINIC | Age: 63
End: 2023-10-25

## 2023-10-25 NOTE — TELEPHONE ENCOUNTER
Called patient to reschedule 11/9/23 appointment due to TS being out of the office. Unable to leave a message due to vm not being set up.

## 2023-11-21 ENCOUNTER — OFFICE VISIT (OUTPATIENT)
Dept: FAMILY MEDICINE CLINIC | Age: 63
End: 2023-11-21
Payer: COMMERCIAL

## 2023-11-21 VITALS
SYSTOLIC BLOOD PRESSURE: 122 MMHG | TEMPERATURE: 98 F | RESPIRATION RATE: 16 BRPM | WEIGHT: 257 LBS | DIASTOLIC BLOOD PRESSURE: 70 MMHG | HEIGHT: 60 IN | HEART RATE: 80 BPM | BODY MASS INDEX: 50.45 KG/M2

## 2023-11-21 DIAGNOSIS — E78.5 HYPERLIPIDEMIA, UNSPECIFIED HYPERLIPIDEMIA TYPE: ICD-10-CM

## 2023-11-21 DIAGNOSIS — Z13.220 SCREENING, LIPID: ICD-10-CM

## 2023-11-21 DIAGNOSIS — R73.01 IFG (IMPAIRED FASTING GLUCOSE): Primary | ICD-10-CM

## 2023-11-21 DIAGNOSIS — E03.8 SUBCLINICAL HYPOTHYROIDISM: ICD-10-CM

## 2023-11-21 PROCEDURE — 4004F PT TOBACCO SCREEN RCVD TLK: CPT | Performed by: NURSE PRACTITIONER

## 2023-11-21 PROCEDURE — 1123F ACP DISCUSS/DSCN MKR DOCD: CPT | Performed by: NURSE PRACTITIONER

## 2023-11-21 PROCEDURE — 3017F COLORECTAL CA SCREEN DOC REV: CPT | Performed by: NURSE PRACTITIONER

## 2023-11-21 PROCEDURE — 99214 OFFICE O/P EST MOD 30 MIN: CPT | Performed by: NURSE PRACTITIONER

## 2023-11-21 PROCEDURE — G8417 CALC BMI ABV UP PARAM F/U: HCPCS | Performed by: NURSE PRACTITIONER

## 2023-11-21 PROCEDURE — G8484 FLU IMMUNIZE NO ADMIN: HCPCS | Performed by: NURSE PRACTITIONER

## 2023-11-21 PROCEDURE — G8400 PT W/DXA NO RESULTS DOC: HCPCS | Performed by: NURSE PRACTITIONER

## 2023-11-21 PROCEDURE — 1090F PRES/ABSN URINE INCON ASSESS: CPT | Performed by: NURSE PRACTITIONER

## 2023-11-21 PROCEDURE — G8427 DOCREV CUR MEDS BY ELIG CLIN: HCPCS | Performed by: NURSE PRACTITIONER

## 2023-11-21 RX ORDER — ALBUTEROL SULFATE 90 UG/1
2 AEROSOL, METERED RESPIRATORY (INHALATION) 4 TIMES DAILY PRN
Qty: 18 G | Refills: 0 | Status: SHIPPED | OUTPATIENT
Start: 2023-11-21

## 2023-11-21 RX ORDER — LEVOTHYROXINE SODIUM 0.05 MG/1
50 TABLET ORAL DAILY
Qty: 90 TABLET | Refills: 3 | Status: SHIPPED | OUTPATIENT
Start: 2023-11-21

## 2023-11-21 ASSESSMENT — ENCOUNTER SYMPTOMS
BACK PAIN: 1
WHEEZING: 1
SHORTNESS OF BREATH: 1

## 2023-11-21 NOTE — PATIENT INSTRUCTIONS
[address]  [address]  [address] [facility], [address] [facility]

## 2023-11-28 ASSESSMENT — ENCOUNTER SYMPTOMS
VOMITING: 0
BLOOD IN STOOL: 0
NAUSEA: 0
CONSTIPATION: 0
DIARRHEA: 0

## 2023-12-31 ENCOUNTER — HOSPITAL ENCOUNTER (EMERGENCY)
Age: 63
Discharge: HOME | End: 2023-12-31
Payer: COMMERCIAL

## 2023-12-31 VITALS
TEMPERATURE: 97.9 F | DIASTOLIC BLOOD PRESSURE: 94 MMHG | OXYGEN SATURATION: 94 % | RESPIRATION RATE: 20 BRPM | SYSTOLIC BLOOD PRESSURE: 180 MMHG | HEART RATE: 86 BPM

## 2023-12-31 DIAGNOSIS — B96.89 BACTERIAL SINUSITIS: ICD-10-CM

## 2023-12-31 DIAGNOSIS — J20.9 ACUTE BRONCHITIS, UNSPECIFIED ORGANISM: Primary | ICD-10-CM

## 2023-12-31 DIAGNOSIS — J32.9 BACTERIAL SINUSITIS: ICD-10-CM

## 2023-12-31 PROCEDURE — 6370000000 HC RX 637 (ALT 250 FOR IP): Performed by: EMERGENCY MEDICINE

## 2023-12-31 PROCEDURE — 99214 OFFICE O/P EST MOD 30 MIN: CPT

## 2023-12-31 PROCEDURE — 99213 OFFICE O/P EST LOW 20 MIN: CPT

## 2023-12-31 PROCEDURE — 94640 AIRWAY INHALATION TREATMENT: CPT

## 2023-12-31 RX ORDER — BENZONATATE 100 MG/1
100 CAPSULE ORAL 3 TIMES DAILY PRN
Qty: 30 CAPSULE | Refills: 0 | Status: SHIPPED | OUTPATIENT
Start: 2023-12-31 | End: 2024-01-10

## 2023-12-31 RX ORDER — FLUCONAZOLE 150 MG/1
150 TABLET ORAL
Qty: 2 TABLET | Refills: 0 | Status: SHIPPED | OUTPATIENT
Start: 2023-12-31 | End: 2024-01-06

## 2023-12-31 RX ORDER — IPRATROPIUM BROMIDE AND ALBUTEROL SULFATE 2.5; .5 MG/3ML; MG/3ML
1 SOLUTION RESPIRATORY (INHALATION) ONCE
Status: COMPLETED | OUTPATIENT
Start: 2023-12-31 | End: 2023-12-31

## 2023-12-31 RX ORDER — AMOXICILLIN AND CLAVULANATE POTASSIUM 875; 125 MG/1; MG/1
1 TABLET, FILM COATED ORAL 2 TIMES DAILY
Qty: 20 TABLET | Refills: 0 | Status: SHIPPED | OUTPATIENT
Start: 2023-12-31 | End: 2024-01-10

## 2023-12-31 RX ORDER — PREDNISONE 20 MG/1
20 TABLET ORAL 2 TIMES DAILY
Qty: 10 TABLET | Refills: 0 | Status: SHIPPED | OUTPATIENT
Start: 2023-12-31 | End: 2024-01-05

## 2023-12-31 RX ADMIN — IPRATROPIUM BROMIDE AND ALBUTEROL SULFATE 1 DOSE: .5; 3 SOLUTION RESPIRATORY (INHALATION) at 13:15

## 2023-12-31 NOTE — ED TRIAGE NOTES
Pt to UC for c/o shortness of breath, cough and congestion x 1 week. Pt presents with expiratory wheezing during triage. Pt reports albuterol tx this am around 9 am.

## 2023-12-31 NOTE — ED PROVIDER NOTES
[facility]  Urgent Care Encounter       CHIEF COMPLAINT       Chief Complaint   Patient presents with    Cough    Chest Congestion       Nurses Notes reviewed and I agree except as noted in the HPI.  HISTORY OF PRESENT ILLNESS   Sylvie Pink is a 63 y.o. female who presents with complaints of cough, congestion, sinus pressure, headache, and green mucus discharge.  Patient reports that symptoms started about a week ago.  Patient reports that she has been wheezing, and used albuterol nebulizer.    The history is provided by the patient.       REVIEW OF SYSTEMS     Review of Systems   HENT:  Positive for congestion and sinus pressure.    Respiratory:  Positive for cough and wheezing.    All other systems reviewed and are negative.      PAST MEDICAL HISTORY         Diagnosis Date    COVID-19 2023    Hyperlipidemia 2016    IFG (impaired fasting glucose) 2016    Morbid obesity (HCC) 2015       SURGICALHISTORY     Patient  has a past surgical history that includes Rotator cuff repair ();  section (, ); Hysterectomy (~); and Septoplasty (2016).    CURRENT MEDICATIONS       Previous Medications    ALBUTEROL (PROVENTIL) (2.5 MG/3ML) 0.083% NEBULIZER SOLUTION    USE 1 VIAL IN NEBULIZER THREE TIMES DAILY AS NEEDED    ALBUTEROL SULFATE HFA (VENTOLIN HFA) 108 (90 BASE) MCG/ACT INHALER    Inhale 2 puffs into the lungs 4 times daily as needed for Wheezing    ASPIRIN 81 MG EC TABLET    Take 1 tablet by mouth daily    IBUPROFEN (ADVIL;MOTRIN) 200 MG TABLET    Take 1 tablet by mouth every 6 hours as needed for Pain Taking 3 tabs    LEVOTHYROXINE (SYNTHROID) 50 MCG TABLET    Take 1 tablet by mouth daily    LOSARTAN (COZAAR) 50 MG TABLET    Take 1 tablet by mouth daily       ALLERGIES     Patient is has No Known Allergies.    Patients   There is no immunization history on file for this patient.    FAMILY HISTORY     Patient's family history includes Cancer in her

## 2024-01-02 RX ORDER — LOSARTAN POTASSIUM 50 MG/1
50 TABLET ORAL DAILY
Qty: 90 TABLET | Refills: 3 | Status: SHIPPED | OUTPATIENT
Start: 2024-01-02

## 2024-01-02 NOTE — TELEPHONE ENCOUNTER
This medication refill is regarding a telephone request. Refill requested by patient.    Requested Prescriptions     Pending Prescriptions Disp Refills    losartan (COZAAR) 50 MG tablet 90 tablet 3     Sig: Take 1 tablet by mouth daily       Date of last visit: 11/21/2023   Date of next visit: 2/29/2024  Date of last refill: 7/3/23 for 90/3  Pharm[facility] Name: [facility]    Rx verified, ordered and set to LIUDMILA BAUM

## 2024-02-01 LAB
ABSOLUTE BASO #: 0.04 K/UL (ref 0–0.2)
ABSOLUTE EOS #: 0.27 K/UL (ref 0–0.5)
ABSOLUTE LYMPH #: 1.56 K/UL (ref 1–4)
ABSOLUTE MONO #: 0.55 K/UL (ref 0.2–1)
ABSOLUTE NEUT #: 4.18 K/UL (ref 1.5–7.5)
ALBUMIN SERPL-MCNC: 4.2 G/DL (ref 3.5–5.2)
ALK PHOSPHATASE: 87 U/L (ref 40–140)
ALT SERPL-CCNC: 13 U/L (ref 5–40)
ANION GAP SERPL CALCULATED.3IONS-SCNC: 10 MEQ/L (ref 7–16)
AST SERPL-CCNC: 13 U/L (ref 9–40)
BASOPHILS RELATIVE PERCENT: 0.6 %
BILIRUB SERPL-MCNC: 0.7 MG/DL
BUN BLDV-MCNC: 14 MG/DL (ref 8–23)
CALCIUM SERPL-MCNC: 8.5 MG/DL (ref 8.5–10.5)
CHLORIDE BLD-SCNC: 103 MEQ/L (ref 95–107)
CHOLESTEROL/HDL RATIO: 4.2 RATIO
CHOLESTEROL: 217 MG/DL
CO2: 27 MEQ/L (ref 19–31)
CREAT SERPL-MCNC: 0.74 MG/DL (ref 0.6–1.3)
CREATINE, URINE: 81.9 MG/DL
EGFR IF NONAFRICAN AMERICAN: 90 ML/MIN/1.73
EOSINOPHILS RELATIVE PERCENT: 4.1 %
ESTIMATED AVERAGE GLUCOSE: 134 MG/DL
GLUCOSE: 118 MG/DL (ref 70–99)
HBA1C MFR BLD: 6.3 % (ref 4.2–5.6)
HCT VFR BLD CALC: 39.5 % (ref 34–45)
HDLC SERPL-MCNC: 52 MG/DL
HEMOGLOBIN: 13.2 G/DL (ref 11.5–15.5)
LDL CHOLESTEROL CALCULATED: 148 MG/DL
LDL/HDL RATIO: 2.8 RATIO
LYMPHOCYTE %: 23.6 %
MCH RBC QN AUTO: 27.1 PG (ref 25–33)
MCHC RBC AUTO-ENTMCNC: 33.4 G/DL (ref 31–36)
MCV RBC AUTO: 81.1 FL (ref 80–99)
MICROALBUMIN/CREAT 24H UR: <1.2 MG/DL
MICROALBUMIN/CREAT UR-RTO: NORMAL MG/G
MONOCYTES # BLD: 8.3 %
NEUTROPHILS RELATIVE PERCENT: 63.1 %
PDW BLD-RTO: 15 % (ref 11.5–15)
PLATELETS: 200 K/UL (ref 130–400)
PMV BLD AUTO: 10.7 FL (ref 9.3–13)
POTASSIUM SERPL-SCNC: 4.5 MEQ/L (ref 3.5–5.4)
RBC: 4.87 M/UL (ref 3.8–5.4)
SODIUM BLD-SCNC: 140 MEQ/L (ref 133–146)
TOTAL PROTEIN: 6 G/DL (ref 6.1–8.3)
TRIGL SERPL-MCNC: 86 MG/DL
VLDLC SERPL CALC-MCNC: 17 MG/DL
WBC: 6.6 K/UL (ref 3.5–11)

## 2024-02-29 ENCOUNTER — OFFICE VISIT (OUTPATIENT)
Dept: FAMILY MEDICINE CLINIC | Age: 64
End: 2024-02-29
Payer: COMMERCIAL

## 2024-02-29 VITALS
DIASTOLIC BLOOD PRESSURE: 82 MMHG | HEIGHT: 60 IN | BODY MASS INDEX: 51.83 KG/M2 | RESPIRATION RATE: 16 BRPM | TEMPERATURE: 97.8 F | HEART RATE: 76 BPM | WEIGHT: 264 LBS | SYSTOLIC BLOOD PRESSURE: 136 MMHG

## 2024-02-29 DIAGNOSIS — J44.9 CHRONIC OBSTRUCTIVE PULMONARY DISEASE, UNSPECIFIED COPD TYPE (HCC): ICD-10-CM

## 2024-02-29 DIAGNOSIS — E78.5 HYPERLIPIDEMIA, UNSPECIFIED HYPERLIPIDEMIA TYPE: ICD-10-CM

## 2024-02-29 DIAGNOSIS — E78.00 HIGH CHOLESTEROL: ICD-10-CM

## 2024-02-29 DIAGNOSIS — E03.8 SUBCLINICAL HYPOTHYROIDISM: ICD-10-CM

## 2024-02-29 DIAGNOSIS — R73.01 IFG (IMPAIRED FASTING GLUCOSE): ICD-10-CM

## 2024-02-29 DIAGNOSIS — Z00.00 WELL ADULT EXAM: Primary | ICD-10-CM

## 2024-02-29 DIAGNOSIS — Z72.0 TOBACCO ABUSE: ICD-10-CM

## 2024-02-29 DIAGNOSIS — L98.9 LESION OF SKIN OF SCALP: ICD-10-CM

## 2024-02-29 DIAGNOSIS — E66.01 MORBID OBESITY (HCC): ICD-10-CM

## 2024-02-29 DIAGNOSIS — I10 PRIMARY HYPERTENSION: ICD-10-CM

## 2024-02-29 DIAGNOSIS — M54.50 MIDLINE LOW BACK PAIN WITHOUT SCIATICA, UNSPECIFIED CHRONICITY: ICD-10-CM

## 2024-02-29 PROCEDURE — 3079F DIAST BP 80-89 MM HG: CPT | Performed by: NURSE PRACTITIONER

## 2024-02-29 PROCEDURE — 99397 PER PM REEVAL EST PAT 65+ YR: CPT | Performed by: NURSE PRACTITIONER

## 2024-02-29 PROCEDURE — 3075F SYST BP GE 130 - 139MM HG: CPT | Performed by: NURSE PRACTITIONER

## 2024-02-29 PROCEDURE — G8484 FLU IMMUNIZE NO ADMIN: HCPCS | Performed by: NURSE PRACTITIONER

## 2024-02-29 RX ORDER — ALBUTEROL SULFATE 2.5 MG/3ML
SOLUTION RESPIRATORY (INHALATION)
Qty: 25 EACH | Refills: 2 | Status: SHIPPED | OUTPATIENT
Start: 2024-02-29

## 2024-02-29 RX ORDER — ALBUTEROL SULFATE 90 UG/1
2 AEROSOL, METERED RESPIRATORY (INHALATION) 4 TIMES DAILY PRN
Qty: 18 G | Refills: 0 | Status: SHIPPED | OUTPATIENT
Start: 2024-02-29

## 2024-02-29 RX ORDER — BUDESONIDE, GLYCOPYRROLATE, AND FORMOTEROL FUMARATE 160; 9; 4.8 UG/1; UG/1; UG/1
AEROSOL, METERED RESPIRATORY (INHALATION)
Qty: 1 EACH | Refills: 2 | Status: SHIPPED | OUTPATIENT
Start: 2024-02-29

## 2024-02-29 RX ORDER — ATORVASTATIN CALCIUM 10 MG/1
10 TABLET, FILM COATED ORAL DAILY
Qty: 30 TABLET | Refills: 3 | Status: SHIPPED | OUTPATIENT
Start: 2024-02-29

## 2024-02-29 SDOH — ECONOMIC STABILITY: INCOME INSECURITY: HOW HARD IS IT FOR YOU TO PAY FOR THE VERY BASICS LIKE FOOD, HOUSING, MEDICAL CARE, AND HEATING?: NOT HARD AT ALL

## 2024-02-29 SDOH — ECONOMIC STABILITY: FOOD INSECURITY: WITHIN THE PAST 12 MONTHS, THE FOOD YOU BOUGHT JUST DIDN'T LAST AND YOU DIDN'T HAVE MONEY TO GET MORE.: NEVER TRUE

## 2024-02-29 SDOH — ECONOMIC STABILITY: FOOD INSECURITY: WITHIN THE PAST 12 MONTHS, YOU WORRIED THAT YOUR FOOD WOULD RUN OUT BEFORE YOU GOT MONEY TO BUY MORE.: NEVER TRUE

## 2024-02-29 ASSESSMENT — PATIENT HEALTH QUESTIONNAIRE - PHQ9
SUM OF ALL RESPONSES TO PHQ QUESTIONS 1-9: 0
1. LITTLE INTEREST OR PLEASURE IN DOING THINGS: 0
2. FEELING DOWN, DEPRESSED OR HOPELESS: 0
SUM OF ALL RESPONSES TO PHQ QUESTIONS 1-9: 0
SUM OF ALL RESPONSES TO PHQ9 QUESTIONS 1 & 2: 0

## 2024-02-29 NOTE — PROGRESS NOTES
months  A1C, TSH, Free T4  Meds refilled  Mammo- declines  Labs as above   Recommend smoking cessation  Follow up in 6 months        Electronically signed by NIKI Tang CNP on 3/4/2024 at 9:28 PM

## 2024-03-04 ASSESSMENT — ENCOUNTER SYMPTOMS
CONSTIPATION: 0
BACK PAIN: 1
DIARRHEA: 0
BLOOD IN STOOL: 0
SHORTNESS OF BREATH: 1
VOMITING: 0
NAUSEA: 0

## 2024-05-22 LAB
ALT SERPL-CCNC: 14 U/L (ref 5–40)
AST SERPL-CCNC: 16 U/L (ref 9–40)
CHOLESTEROL, TOTAL: 160 MG/DL
LDL CHOLESTEROL DIRECT: 91 MG/DL

## 2024-07-01 RX ORDER — ATORVASTATIN CALCIUM 10 MG/1
10 TABLET, FILM COATED ORAL DAILY
Qty: 90 TABLET | Refills: 3 | Status: SHIPPED | OUTPATIENT
Start: 2024-07-01

## 2024-07-01 NOTE — TELEPHONE ENCOUNTER
This medication refill is regarding a electronic request. Refill requested by patient.    Requested Prescriptions     Pending Prescriptions Disp Refills    atorvastatin (LIPITOR) 10 MG tablet [Pharmacy Med Name: Atorvastatin Calcium 10 MG Oral Tablet] 30 tablet 0     Sig: Take 1 tablet by mouth once daily       Date of last visit: 2/29/2024   Date of next visit: 8/29/2024  Date of last refill: 2-29-24     Last Lipid Panel:    Lab Results   Component Value Date/Time    CHOL 160 05/22/2024 07:58 AM    TRIG 86 02/01/2024 08:41 AM    HDL 52 02/01/2024 08:41 AM       Rx verified, ordered and set to EP.

## 2024-08-20 LAB
ESTIMATED AVERAGE GLUCOSE: 128 MG/DL
HBA1C MFR BLD: 6.1 % (ref 4.2–5.6)
T4 FREE: 1.24 NG/DL (ref 0.8–1.9)
TSH SERPL DL<=0.05 MIU/L-ACNC: 4.97 UIU/ML (ref 0.4–4.1)

## 2024-08-29 ENCOUNTER — OFFICE VISIT (OUTPATIENT)
Dept: FAMILY MEDICINE CLINIC | Age: 64
End: 2024-08-29
Payer: COMMERCIAL

## 2024-08-29 VITALS
DIASTOLIC BLOOD PRESSURE: 70 MMHG | HEART RATE: 80 BPM | SYSTOLIC BLOOD PRESSURE: 122 MMHG | BODY MASS INDEX: 49.81 KG/M2 | TEMPERATURE: 98 F | WEIGHT: 250.8 LBS | RESPIRATION RATE: 20 BRPM

## 2024-08-29 DIAGNOSIS — Z72.0 TOBACCO ABUSE: ICD-10-CM

## 2024-08-29 DIAGNOSIS — E66.01 MORBID OBESITY (HCC): ICD-10-CM

## 2024-08-29 DIAGNOSIS — E78.5 HYPERLIPIDEMIA, UNSPECIFIED HYPERLIPIDEMIA TYPE: ICD-10-CM

## 2024-08-29 DIAGNOSIS — R73.01 IFG (IMPAIRED FASTING GLUCOSE): ICD-10-CM

## 2024-08-29 DIAGNOSIS — J44.9 CHRONIC OBSTRUCTIVE PULMONARY DISEASE, UNSPECIFIED COPD TYPE (HCC): ICD-10-CM

## 2024-08-29 DIAGNOSIS — E03.9 HYPOTHYROIDISM, UNSPECIFIED TYPE: Primary | ICD-10-CM

## 2024-08-29 DIAGNOSIS — I10 PRIMARY HYPERTENSION: ICD-10-CM

## 2024-08-29 PROCEDURE — 1123F ACP DISCUSS/DSCN MKR DOCD: CPT | Performed by: NURSE PRACTITIONER

## 2024-08-29 PROCEDURE — 3078F DIAST BP <80 MM HG: CPT | Performed by: NURSE PRACTITIONER

## 2024-08-29 PROCEDURE — 3074F SYST BP LT 130 MM HG: CPT | Performed by: NURSE PRACTITIONER

## 2024-08-29 PROCEDURE — 99214 OFFICE O/P EST MOD 30 MIN: CPT | Performed by: NURSE PRACTITIONER

## 2024-08-29 RX ORDER — LEVOTHYROXINE SODIUM 75 UG/1
75 TABLET ORAL DAILY
Qty: 30 TABLET | Refills: 2 | Status: SHIPPED | OUTPATIENT
Start: 2024-08-29

## 2024-08-29 RX ORDER — ALBUTEROL SULFATE 0.83 MG/ML
SOLUTION RESPIRATORY (INHALATION)
Qty: 25 EACH | Refills: 2 | Status: SHIPPED | OUTPATIENT
Start: 2024-08-29

## 2024-08-29 RX ORDER — ALBUTEROL SULFATE 90 UG/1
2 AEROSOL, METERED RESPIRATORY (INHALATION) 4 TIMES DAILY PRN
Qty: 18 G | Refills: 3 | Status: SHIPPED | OUTPATIENT
Start: 2024-08-29

## 2024-08-29 ASSESSMENT — ENCOUNTER SYMPTOMS
DIARRHEA: 0
SHORTNESS OF BREATH: 1
VOMITING: 0
CONSTIPATION: 0
BLOOD IN STOOL: 0
NAUSEA: 0

## 2024-08-29 NOTE — PROGRESS NOTES
Chief Complaint   Patient presents with    6 Month Follow-Up     Follow up for COPD, hypertension, hyperlipidemia. No changes to breathing. Pt does not monitor BP.         SUBJECTIVE     Sylvie Pink is a 63 y.o.female      Pt presents for 6 month follow up of Copd, hyperlipidemia, HTN, IFG.     Breztri was too expensive as insurance would not cover. Uses albuterol prn.    Free T4 is good but TSH is elevated. She has sensitivity to hot/cold and is more fatigued.    Maybe some depression.  States there are a few factors but mostly cost of living and some issues at home/work.    Review of Systems   Constitutional:  Negative for chills, diaphoresis and fever.   Respiratory:  Positive for shortness of breath (With exertion).    Cardiovascular:  Positive for leg swelling (baseline - improves overnight). Negative for chest pain and palpitations.   Gastrointestinal:  Negative for blood in stool, constipation, diarrhea, nausea and vomiting.   Genitourinary:  Negative for dysuria and hematuria.   Musculoskeletal:  Positive for arthralgias. Negative for myalgias.   Neurological:  Negative for dizziness and headaches.   All other systems reviewed and are negative.      OBJECTIVE     /70 (Site: Left Upper Arm, Position: Sitting)   Pulse 80   Temp 98 °F (36.7 °C) (Oral)   Resp 20   Wt 113.8 kg (250 lb 12.8 oz)   BMI 49.81 kg/m²   Wt Readings from Last 3 Encounters:   08/29/24 113.8 kg (250 lb 12.8 oz)   02/29/24 119.7 kg (264 lb)   11/21/23 116.6 kg (257 lb)   Watching portion sizes.     Physical Exam  Vitals and nursing note reviewed.   Constitutional:       Appearance: She is well-developed. She is obese.   HENT:      Head: Normocephalic and atraumatic.      Right Ear: External ear normal.      Left Ear: External ear normal.      Nose: Nose normal.   Eyes:      Conjunctiva/sclera: Conjunctivae normal.      Pupils: Pupils are equal, round, and reactive to light.   Cardiovascular:      Rate and Rhythm: Normal rate  Metabolic Panel    Microalbumin / Creatinine Urine Ratio      7. Chronic obstructive pulmonary disease, unspecified COPD type (HCC)  CBC with Auto Differential    Comprehensive Metabolic Panel          PLAN     Requested Prescriptions     Signed Prescriptions Disp Refills    albuterol (PROVENTIL) (2.5 MG/3ML) 0.083% nebulizer solution 25 each 2     Sig: USE 1 VIAL IN NEBULIZER THREE TIMES DAILY AS NEEDED    albuterol sulfate HFA (VENTOLIN HFA) 108 (90 Base) MCG/ACT inhaler 18 g 3     Sig: Inhale 2 puffs into the lungs 4 times daily as needed for Wheezing    levothyroxine (SYNTHROID) 75 MCG tablet 30 tablet 2     Sig: Take 1 tablet by mouth daily     Orders Placed This Encounter   Procedures    T4, Free     Standing Status:   Future     Standing Expiration Date:   8/29/2025    TSH     Standing Status:   Future     Standing Expiration Date:   8/29/2025    Lipid Panel     Standing Status:   Future     Standing Expiration Date:   8/29/2025     Order Specific Question:   Is Patient Fasting?/# of Hours     Answer:   yes/12    CBC with Auto Differential     Standing Status:   Future     Standing Expiration Date:   8/29/2025    Comprehensive Metabolic Panel     Standing Status:   Future     Standing Expiration Date:   8/29/2025    Microalbumin / Creatinine Urine Ratio     Standing Status:   Future     Standing Expiration Date:   8/29/2025    Hemoglobin A1C     Standing Status:   Future     Standing Expiration Date:   8/29/2025     Would like to wait on counseling or medication for depression. Pt to call if she changes her mind.  Increase levothyroxine to 75 mcg daily.  Recheck thyroid levels in 8 weeks.  Alyssa completed 6/4/2023  Labs - as above and 6 months  Meds refilled  Mammo- declines  Dexa - declines  Recommend smoking cessation  Follow up in 6 months        Electronically signed by NIKI Tang CNP on 8/29/2024 at 2:31 PM

## 2024-10-23 LAB
T4 FREE: 1.38 NG/DL (ref 0.8–1.9)
TSH SERPL DL<=0.05 MIU/L-ACNC: 3.07 UIU/ML (ref 0.27–4.2)

## 2024-10-24 ENCOUNTER — TELEPHONE (OUTPATIENT)
Dept: FAMILY MEDICINE CLINIC | Age: 64
End: 2024-10-24

## 2024-10-24 DIAGNOSIS — E03.9 HYPOTHYROIDISM, UNSPECIFIED TYPE: ICD-10-CM

## 2024-10-24 RX ORDER — LEVOTHYROXINE SODIUM 75 UG/1
75 TABLET ORAL DAILY
Qty: 90 TABLET | Refills: 3 | Status: SHIPPED | OUTPATIENT
Start: 2024-10-24

## 2024-10-24 NOTE — TELEPHONE ENCOUNTER
Spoke to pt and notified her of the lab results and TS recommendations and she verbalized understanding.       Requested Prescriptions     Pending Prescriptions Disp Refills    levothyroxine (SYNTHROID) 75 MCG tablet 90 tablet 3     Sig: Take 1 tablet by mouth daily     Rx verified, ordered and set to EP to [facility].    SARIKA

## 2024-10-24 NOTE — TELEPHONE ENCOUNTER
----- Message from NIKI Tang CNP sent at 10/23/2024 10:01 AM EDT -----  Thyroid levels look good. Continue current dose of levothyroxine. TS

## 2024-12-27 DIAGNOSIS — I10 PRIMARY HYPERTENSION: Primary | ICD-10-CM

## 2024-12-27 RX ORDER — LOSARTAN POTASSIUM 50 MG/1
50 TABLET ORAL DAILY
Qty: 90 TABLET | Refills: 3 | Status: SHIPPED | OUTPATIENT
Start: 2024-12-27

## 2024-12-27 NOTE — TELEPHONE ENCOUNTER
Sylvie Pink called requesting a refill on the following medications:  Requested Prescriptions     Pending Prescriptions Disp Refills    losartan (COZAAR) 50 MG tablet [Pharmacy Med Name: Losartan Potassium 50 MG Oral Tablet] 90 tablet 0     Sig: Take 1 tablet by mouth once daily       Date of last visit: 8/29/2024  Date of next visit (if applicable):2/27/2025  Date of last refill: 1/2/24 x 1 year  Pharmacy Name: [facility]

## 2025-01-01 ENCOUNTER — HOSPITAL ENCOUNTER (EMERGENCY)
Age: 65
Discharge: HOME | End: 2025-01-01
Payer: MEDICARE

## 2025-01-01 VITALS
DIASTOLIC BLOOD PRESSURE: 80 MMHG | WEIGHT: 255 LBS | TEMPERATURE: 99 F | HEART RATE: 78 BPM | OXYGEN SATURATION: 94 % | BODY MASS INDEX: 55.02 KG/M2 | RESPIRATION RATE: 18 BRPM | SYSTOLIC BLOOD PRESSURE: 138 MMHG | HEIGHT: 57 IN

## 2025-01-01 DIAGNOSIS — B97.89 VIRAL SINUSITIS: Primary | ICD-10-CM

## 2025-01-01 DIAGNOSIS — J32.9 VIRAL SINUSITIS: Primary | ICD-10-CM

## 2025-01-01 LAB
FLUAV AG SPEC QL: NEGATIVE
FLUBV AG SPEC QL: NEGATIVE
SARS-COV-2 RDRP RESP QL NAA+PROBE: NOT  DETECTED

## 2025-01-01 PROCEDURE — 99213 OFFICE O/P EST LOW 20 MIN: CPT | Performed by: EMERGENCY MEDICINE

## 2025-01-01 PROCEDURE — 87804 INFLUENZA ASSAY W/OPTIC: CPT

## 2025-01-01 PROCEDURE — 99213 OFFICE O/P EST LOW 20 MIN: CPT

## 2025-01-01 PROCEDURE — 87635 SARS-COV-2 COVID-19 AMP PRB: CPT

## 2025-01-01 RX ORDER — AZELASTINE 1 MG/ML
1 SPRAY, METERED NASAL 2 TIMES DAILY
Qty: 30 ML | Refills: 0 | Status: SHIPPED | OUTPATIENT
Start: 2025-01-01

## 2025-01-01 ASSESSMENT — ENCOUNTER SYMPTOMS
WHEEZING: 0
COUGH: 1
SINUS PRESSURE: 1
RHINORRHEA: 1
SHORTNESS OF BREATH: 0

## 2025-01-01 ASSESSMENT — PAIN DESCRIPTION - PAIN TYPE: TYPE: ACUTE PAIN

## 2025-01-01 ASSESSMENT — PAIN DESCRIPTION - DESCRIPTORS: DESCRIPTORS: ACHING

## 2025-01-01 ASSESSMENT — PAIN - FUNCTIONAL ASSESSMENT: PAIN_FUNCTIONAL_ASSESSMENT: 0-10

## 2025-01-01 ASSESSMENT — PAIN DESCRIPTION - LOCATION: LOCATION: HEAD

## 2025-01-01 ASSESSMENT — PAIN SCALES - GENERAL: PAINLEVEL_OUTOF10: 6

## 2025-01-01 NOTE — ED PROVIDER NOTES
[facility] URGENT CARE  Urgent Care Encounter       CHIEF COMPLAINT       Chief Complaint   Patient presents with    Facial Pain     Sinus congestion and pressure x 3 days    Concern For COVID-19     Possible covid exposure requesting testing       Nurses Notes reviewed and I agree except as noted in the HPI.  HISTORY OF PRESENT ILLNESS   Sylvie Pink is a 64 y.o. female who presents for sinus congestion, facial pain, occasional cough.  No fevers.  Symptoms present x 2 days.  Patient has not taken anything for her symptoms.  No fever    HPI    REVIEW OF SYSTEMS     Review of Systems   Constitutional:  Negative for activity change, fatigue and fever.   HENT:  Positive for congestion, rhinorrhea and sinus pressure.    Respiratory:  Positive for cough. Negative for shortness of breath and wheezing.        PAST MEDICAL HISTORY         Diagnosis Date    COVID-19 2023    Hyperlipidemia 2016    IFG (impaired fasting glucose) 2016    Morbid obesity 2015       SURGICALHISTORY     Patient  has a past surgical history that includes Rotator cuff repair ();  section (, ); Hysterectomy (~); and Septoplasty (2016).    CURRENT MEDICATIONS       Discharge Medication List as of 2025  5:18 PM        CONTINUE these medications which have NOT CHANGED    Details   losartan (COZAAR) 50 MG tablet Take 1 tablet by mouth once daily, Disp-90 tablet, R-3Normal      levothyroxine (SYNTHROID) 75 MCG tablet Take 1 tablet by mouth daily, Disp-90 tablet, R-3Normal      albuterol (PROVENTIL) (2.5 MG/3ML) 0.083% nebulizer solution USE 1 VIAL IN NEBULIZER THREE TIMES DAILY AS NEEDED, Disp-25 each, R-2Normal      albuterol sulfate HFA (VENTOLIN HFA) 108 (90 Base) MCG/ACT inhaler Inhale 2 puffs into the lungs 4 times daily as needed for Wheezing, Disp-18 g, R-3Normal      atorvastatin (LIPITOR) 10 MG tablet Take 1 tablet by mouth once daily, Disp-90 tablet, R-3Normal      ibuprofen

## 2025-01-01 NOTE — ED TRIAGE NOTES
To room via ambulation. Pt c/o sinus congestion and pressure x 3 days with green discharge. Pt concerned she may have been exposed to covid requesting testing.   
15-Nov-2019

## 2025-01-01 NOTE — DISCHARGE INSTRUCTIONS
Begin taking Coricidin as discussed    Azelastine nasal spray as directed    Drink plenty water    Return for new or worsening symptoms

## 2025-02-26 LAB
ALBUMIN: 4.2 G/DL (ref 3.5–5.2)
ALK PHOSPHATASE: 102 U/L (ref 30–134)
ALT SERPL-CCNC: 11 U/L (ref 5–33)
ANION GAP SERPL CALCULATED.3IONS-SCNC: 12 MMOL/L (ref 7–16)
AST SERPL-CCNC: 12 U/L (ref 9–40)
BASOPHILS ABSOLUTE: 0.05 K/UL (ref 0–0.2)
BASOPHILS RELATIVE PERCENT: 0.7 % (ref 0–2)
BILIRUB SERPL-MCNC: 0.7 MG/DL
BUN BLDV-MCNC: 13 MG/DL (ref 8–23)
CALCIUM SERPL-MCNC: 9.2 MG/DL (ref 8.6–10.5)
CHLORIDE BLD-SCNC: 105 MMOL/L (ref 96–107)
CHOLESTEROL, TOTAL: 145 MG/DL (ref 100–199)
CHOLESTEROL/HDL RATIO: 2.9 (ref 2–4.5)
CO2: 28 MMOL/L (ref 18–32)
CREAT SERPL-MCNC: 0.79 MG/DL (ref 0.51–1.15)
CREATINE, URINE: 40.4 MG/DL
EGFR IF NONAFRICAN AMERICAN: 82 ML/MIN/1.73M2
EOSINOPHILS ABSOLUTE: 0.2 K/UL (ref 0–0.8)
EOSINOPHILS RELATIVE PERCENT: 2.8 % (ref 0–5)
ESTIMATED AVERAGE GLUCOSE: 123 MG/DL
GLUCOSE: 106 MG/DL (ref 70–100)
HBA1C MFR BLD: 5.9 %
HCT VFR BLD CALC: 45.3 % (ref 35–47)
HDLC SERPL-MCNC: 50 MG/DL
HEMOGLOBIN: 14.3 G/DL (ref 11.9–16)
IMMATURE GRANS (ABS): 0.02 K/UL (ref 0–0.06)
IMMATURE GRANULOCYTES %: 0.3 % (ref 0–2)
LDL CHOLESTEROL: 78 MG/DL
LDL/HDL RATIO: 1.6
LYMPHOCYTES ABSOLUTE: 1.87 K/UL (ref 0.9–5.2)
LYMPHOCYTES RELATIVE PERCENT: 25.8 % (ref 20–45)
MCH RBC QN AUTO: 26.4 PG (ref 26–33)
MCHC RBC AUTO-ENTMCNC: 31.6 G/DL (ref 32–35)
MCV RBC AUTO: 84 FL (ref 75–100)
MICROALBUMIN/CREAT 24H UR: <3 MG/L
MICROALBUMIN/CREAT UR-RTO: NORMAL MG/G
MONOCYTES ABSOLUTE: 0.58 K/UL (ref 0.1–1)
MONOCYTES RELATIVE PERCENT: 8 % (ref 0–13)
NEUTROPHILS ABSOLUTE: 4.53 K/UL (ref 1.9–8)
NEUTROPHILS RELATIVE PERCENT: 62.4 % (ref 45–75)
PDW BLD-RTO: 14.4 % (ref 11.2–14.8)
PLATELET # BLD: 203 THOUS/CMM (ref 140–440)
POTASSIUM SERPL-SCNC: 5.2 MMOL/L (ref 3.5–5.4)
RBC # BLD: 5.41 MILL/CMM (ref 3.8–5.2)
SODIUM BLD-SCNC: 145 MMOL/L (ref 135–148)
TOTAL PROTEIN: 6.4 G/DL (ref 6–8.3)
TRIGL SERPL-MCNC: 83 MG/DL (ref 20–149)
VLDLC SERPL CALC-MCNC: 17 MG/DL
WBC # BLD: 7.3 THDS/CMM (ref 3.6–11)

## 2025-02-27 ENCOUNTER — OFFICE VISIT (OUTPATIENT)
Dept: FAMILY MEDICINE CLINIC | Age: 65
End: 2025-02-27

## 2025-02-27 VITALS
HEIGHT: 57 IN | SYSTOLIC BLOOD PRESSURE: 128 MMHG | RESPIRATION RATE: 20 BRPM | DIASTOLIC BLOOD PRESSURE: 72 MMHG | BODY MASS INDEX: 53.72 KG/M2 | WEIGHT: 249 LBS | HEART RATE: 84 BPM | TEMPERATURE: 98.1 F

## 2025-02-27 DIAGNOSIS — H53.8 BLURRY VISION, BILATERAL: ICD-10-CM

## 2025-02-27 DIAGNOSIS — Z12.31 SCREENING MAMMOGRAM FOR BREAST CANCER: ICD-10-CM

## 2025-02-27 DIAGNOSIS — E78.5 HYPERLIPIDEMIA, UNSPECIFIED HYPERLIPIDEMIA TYPE: ICD-10-CM

## 2025-02-27 DIAGNOSIS — E66.01 MORBID OBESITY: ICD-10-CM

## 2025-02-27 DIAGNOSIS — I10 PRIMARY HYPERTENSION: ICD-10-CM

## 2025-02-27 DIAGNOSIS — Z13.1 SCREENING FOR DIABETES MELLITUS: ICD-10-CM

## 2025-02-27 DIAGNOSIS — Z72.0 TOBACCO ABUSE: ICD-10-CM

## 2025-02-27 DIAGNOSIS — E03.9 HYPOTHYROIDISM, UNSPECIFIED TYPE: ICD-10-CM

## 2025-02-27 DIAGNOSIS — Z00.00 WELCOME TO MEDICARE PREVENTIVE VISIT: Primary | ICD-10-CM

## 2025-02-27 DIAGNOSIS — R73.01 IFG (IMPAIRED FASTING GLUCOSE): ICD-10-CM

## 2025-02-27 DIAGNOSIS — E78.00 HIGH CHOLESTEROL: ICD-10-CM

## 2025-02-27 SDOH — ECONOMIC STABILITY: FOOD INSECURITY: WITHIN THE PAST 12 MONTHS, THE FOOD YOU BOUGHT JUST DIDN'T LAST AND YOU DIDN'T HAVE MONEY TO GET MORE.: NEVER TRUE

## 2025-02-27 SDOH — ECONOMIC STABILITY: FOOD INSECURITY: WITHIN THE PAST 12 MONTHS, YOU WORRIED THAT YOUR FOOD WOULD RUN OUT BEFORE YOU GOT MONEY TO BUY MORE.: NEVER TRUE

## 2025-02-27 ASSESSMENT — PATIENT HEALTH QUESTIONNAIRE - PHQ9
2. FEELING DOWN, DEPRESSED OR HOPELESS: NOT AT ALL
SUM OF ALL RESPONSES TO PHQ QUESTIONS 1-9: 0
SUM OF ALL RESPONSES TO PHQ QUESTIONS 1-9: 0
SUM OF ALL RESPONSES TO PHQ9 QUESTIONS 1 & 2: 0
SUM OF ALL RESPONSES TO PHQ QUESTIONS 1-9: 0
SUM OF ALL RESPONSES TO PHQ QUESTIONS 1-9: 0
1. LITTLE INTEREST OR PLEASURE IN DOING THINGS: NOT AT ALL

## 2025-02-27 ASSESSMENT — ENCOUNTER SYMPTOMS
DIARRHEA: 0
SHORTNESS OF BREATH: 1
NAUSEA: 0
VOMITING: 0
CONSTIPATION: 0
BLOOD IN STOOL: 0

## 2025-02-27 ASSESSMENT — VISUAL ACUITY
OS_CC: 20/50
OD_CC: 20/200

## 2025-02-27 ASSESSMENT — LIFESTYLE VARIABLES
HOW MANY STANDARD DRINKS CONTAINING ALCOHOL DO YOU HAVE ON A TYPICAL DAY: PATIENT DOES NOT DRINK
HOW OFTEN DO YOU HAVE A DRINK CONTAINING ALCOHOL: NEVER

## 2025-02-27 NOTE — PATIENT INSTRUCTIONS
smoke too.     Stay at a weight that's healthy for you. Talk to your doctor if you need help losing weight.     Try to get 7 to 9 hours of sleep each night.     Limit alcohol to 2 drinks a day for men and 1 drink a day for women. Too much alcohol can cause health problems.     Manage other health problems such as diabetes, high blood pressure, and high cholesterol. If you think you may have a problem with alcohol or drug use, talk to your doctor.   Medicines    Take your medicines exactly as prescribed. Call your doctor if you think you are having a problem with your medicine.     If your doctor recommends aspirin, take the amount directed each day. Make sure you take aspirin and not another kind of pain reliever, such as acetaminophen (Tylenol).   When should you call for help?   Call 911 if you have symptoms of a heart attack. These may include:    Chest pain or pressure, or a strange feeling in the chest.     Sweating.     Shortness of breath.     Pain, pressure, or a strange feeling in the back, neck, jaw, or upper belly or in one or both shoulders or arms.     Lightheadedness or sudden weakness.     A fast or irregular heartbeat.   After you call 911, the  may tell you to chew 1 adult-strength or 2 to 4 low-dose aspirin. Wait for an ambulance. Do not try to drive yourself.  Watch closely for changes in your health, and be sure to contact your doctor if you have any problems.  Where can you learn more?  Go to https://www.[employer].net/patientEd and enter F075 to learn more about \"A Healthy Heart: Care Instructions.\"  Current as of: July 31, 2024  Content Version: 14.3  © 2024 [employer].   Care instructions adapted under license by [employer]. If you have questions about a medical condition or this instruction, always ask your healthcare professional. [employer] disclaims any warranty or liability for your use of this information.    Personalized Preventive Plan for Sylvie LUIS EDUARDO

## 2025-02-27 NOTE — PROGRESS NOTES
Health Maintenance Due   Topic Date Due    DTaP/Tdap/Td vaccine (1 - Tdap) Never done    Pneumococcal 50+ years Vaccine (1 of 2 - PCV) Never done    Shingles vaccine (1 of 2) Never done    DEXA (modify frequency per FRAX score)  Never done    Breast cancer screen  04/01/2018    Respiratory Syncytial Virus (RSV) Pregnant or age 60 yrs+ (1 - Risk 60-74 years 1-dose series) Never done    Flu vaccine (1) Never done    Annual Wellness Visit (Medicare)  Never done    COVID-19 Vaccine (1 - 2024-25 season) Never done    Depression Screen  02/28/2025

## 2025-02-27 NOTE — PROGRESS NOTES
Chief Complaint   Patient presents with    Medicare AWV     Pt has complaint of migraine along with neck pain.      SUBJECTIVE     Sylvie Pink is a 64 y.o.female      Pt presents for Medicare Annual Wellness and 6 month follow up of Copd, hyperlipidemia, HTN, IFG.     Is trying to eat healthier.     Vision is blurry. She failed her vision test and can no longer drive at night. Has not been to the eye doctor recently but plans to schedule soon.    Pt quit her job yesterday. Got into it with her boss. She is stressed over this.     Review of Systems   Constitutional:  Negative for chills, diaphoresis and fever.   Eyes:  Positive for visual disturbance.   Respiratory:  Positive for shortness of breath (at exertion - stable).    Cardiovascular:  Negative for chest pain, palpitations and leg swelling.   Gastrointestinal:  Negative for blood in stool, constipation, diarrhea, nausea and vomiting.   Genitourinary:  Negative for dysuria and hematuria.   Musculoskeletal:  Positive for arthralgias. Negative for myalgias.   Neurological:  Positive for weakness (hips/legs) and headaches. Negative for dizziness.   All other systems reviewed and are negative.      OBJECTIVE     /72 (Site: Left Upper Arm)   Pulse 84   Temp 98.1 °F (36.7 °C) (Oral)   Resp 20   Ht 1.448 m (4' 9\")   Wt 112.9 kg (249 lb)   BMI 53.88 kg/m²   Wt Readings from Last 3 Encounters:   02/27/25 112.9 kg (249 lb)   01/01/25 115.7 kg (255 lb)   08/29/24 113.8 kg (250 lb 12.8 oz)   Watching portion sizes. Trying to eat healthier.     Physical Exam  Vitals and nursing note reviewed.   Constitutional:       Appearance: She is well-developed. She is obese.   HENT:      Head: Normocephalic and atraumatic.      Right Ear: External ear normal.      Left Ear: External ear normal.      Nose: Nose normal.   Eyes:      Conjunctiva/sclera: Conjunctivae normal.      Pupils: Pupils are equal, round, and reactive to light.   Cardiovascular:      Rate and Rhythm:

## 2025-04-02 ENCOUNTER — OFFICE VISIT (OUTPATIENT)
Dept: FAMILY MEDICINE CLINIC | Age: 65
End: 2025-04-02
Payer: MEDICARE

## 2025-04-02 VITALS
SYSTOLIC BLOOD PRESSURE: 120 MMHG | RESPIRATION RATE: 24 BRPM | BODY MASS INDEX: 54.66 KG/M2 | HEART RATE: 80 BPM | DIASTOLIC BLOOD PRESSURE: 72 MMHG | WEIGHT: 252.6 LBS

## 2025-04-02 DIAGNOSIS — J40 BRONCHITIS: Primary | ICD-10-CM

## 2025-04-02 DIAGNOSIS — B96.89 ACUTE BACTERIAL SINUSITIS: ICD-10-CM

## 2025-04-02 DIAGNOSIS — J44.9 CHRONIC OBSTRUCTIVE PULMONARY DISEASE, UNSPECIFIED COPD TYPE (HCC): ICD-10-CM

## 2025-04-02 DIAGNOSIS — J01.90 ACUTE BACTERIAL SINUSITIS: ICD-10-CM

## 2025-04-02 PROCEDURE — 3017F COLORECTAL CA SCREEN DOC REV: CPT | Performed by: NURSE PRACTITIONER

## 2025-04-02 PROCEDURE — G8400 PT W/DXA NO RESULTS DOC: HCPCS | Performed by: NURSE PRACTITIONER

## 2025-04-02 PROCEDURE — 99213 OFFICE O/P EST LOW 20 MIN: CPT | Performed by: NURSE PRACTITIONER

## 2025-04-02 PROCEDURE — 3023F SPIROM DOC REV: CPT | Performed by: NURSE PRACTITIONER

## 2025-04-02 PROCEDURE — G8427 DOCREV CUR MEDS BY ELIG CLIN: HCPCS | Performed by: NURSE PRACTITIONER

## 2025-04-02 PROCEDURE — G8417 CALC BMI ABV UP PARAM F/U: HCPCS | Performed by: NURSE PRACTITIONER

## 2025-04-02 PROCEDURE — 1090F PRES/ABSN URINE INCON ASSESS: CPT | Performed by: NURSE PRACTITIONER

## 2025-04-02 PROCEDURE — 4004F PT TOBACCO SCREEN RCVD TLK: CPT | Performed by: NURSE PRACTITIONER

## 2025-04-02 PROCEDURE — 1123F ACP DISCUSS/DSCN MKR DOCD: CPT | Performed by: NURSE PRACTITIONER

## 2025-04-02 RX ORDER — METHYLPREDNISOLONE 4 MG/1
TABLET ORAL
Qty: 1 KIT | Refills: 0 | Status: SHIPPED | OUTPATIENT
Start: 2025-04-02 | End: 2025-04-08

## 2025-04-02 RX ORDER — ALBUTEROL SULFATE 0.83 MG/ML
SOLUTION RESPIRATORY (INHALATION)
Qty: 25 EACH | Refills: 2 | Status: SHIPPED | OUTPATIENT
Start: 2025-04-02

## 2025-04-02 RX ORDER — DOXYCYCLINE HYCLATE 100 MG
100 TABLET ORAL 2 TIMES DAILY
Qty: 20 TABLET | Refills: 0 | Status: SHIPPED | OUTPATIENT
Start: 2025-04-02 | End: 2025-04-12

## 2025-04-02 RX ORDER — BROMPHENIRAMINE MALEATE, PSEUDOEPHEDRINE HYDROCHLORIDE, AND DEXTROMETHORPHAN HYDROBROMIDE 2; 30; 10 MG/5ML; MG/5ML; MG/5ML
5 SYRUP ORAL 4 TIMES DAILY PRN
Qty: 118 ML | Refills: 0 | Status: SHIPPED | OUTPATIENT
Start: 2025-04-02

## 2025-04-02 RX ORDER — CEFDINIR 300 MG/1
300 CAPSULE ORAL 2 TIMES DAILY
Qty: 20 CAPSULE | Refills: 0 | Status: CANCELLED | OUTPATIENT
Start: 2025-04-02 | End: 2025-04-12

## 2025-04-02 RX ORDER — NEBULIZER ACCESSORIES
1 EACH MISCELLANEOUS ONCE
Qty: 1 EACH | Refills: 0 | Status: SHIPPED | OUTPATIENT
Start: 2025-04-02 | End: 2025-04-02

## 2025-04-02 ASSESSMENT — ENCOUNTER SYMPTOMS
HEARTBURN: 0
COUGH: 1
RHINORRHEA: 1

## 2025-04-02 NOTE — PROGRESS NOTES
[facility]  [facility]  [address] [address]  Dept: [phone]  Dept Fax: [phone]  Loc: [phone]     Visit Date:  4/2/2025      Patient:  Sylvie Pink  YOB: 1960    HPI:     Chief Complaint   Patient presents with    Sore Throat     Started last Thursday as an earache and went to throat, chest, then back to throat, coughing.        Pt presents to the office today for sinus, sore throat and congestion.  Started about a week ago.  No fevers or chills.  HX of easily getting sinus infections.  HX COPD also.    Sinusitis  This is a new problem. The current episode started in the past 7 days. The problem has been waxing and waning since onset. There has been no fever. The pain is moderate. Associated symptoms include congestion, coughing, headaches, a hoarse voice, sinus pressure, sneezing and a sore throat. Pertinent negatives include no chills, diaphoresis, ear pain, neck pain, shortness of breath or swollen glands. Past treatments include sitting up and oral decongestants. The treatment provided mild relief.   Cough  This is a new problem. The current episode started in the past 7 days. The problem has been gradually worsening. The cough is Productive of sputum. Associated symptoms include ear congestion, headaches, nasal congestion, postnasal drip, rhinorrhea and a sore throat. Pertinent negatives include no chest pain, chills, ear pain, fever, heartburn, hemoptysis, myalgias, rash, shortness of breath, sweats, weight loss or wheezing. The symptoms are aggravated by lying down. She has tried rest, OTC cough suppressant, body position changes and cool air for the symptoms. Her past medical history is significant for environmental allergies. There is no history of asthma, bronchiectasis, bronchitis, emphysema or pneumonia.       Medications    Current Outpatient Medications:     doxycycline hyclate (VIBRA-TABS) 100 MG tablet,

## 2025-04-03 ASSESSMENT — ENCOUNTER SYMPTOMS
SINUS PRESSURE: 1
HEMOPTYSIS: 0
WHEEZING: 0
SORE THROAT: 1
SHORTNESS OF BREATH: 0
HOARSE VOICE: 1
SWOLLEN GLANDS: 0

## 2025-06-16 RX ORDER — ATORVASTATIN CALCIUM 10 MG/1
10 TABLET, FILM COATED ORAL DAILY
Qty: 90 TABLET | Refills: 3 | Status: SHIPPED | OUTPATIENT
Start: 2025-06-16

## 2025-06-16 NOTE — TELEPHONE ENCOUNTER
This medication refill is regarding a electronic request. Refill requested by [facility] [address]    Requested Prescriptions     Pending Prescriptions Disp Refills    atorvastatin (LIPITOR) 10 MG tablet [Pharmacy Med Name: Atorvastatin Calcium 10 MG Oral Tablet] 90 tablet 3     Sig: Take 1 tablet by mouth once daily     Date of last visit: 4/2/2025   Date of next visit: 8/27/2025  Date of last refill: 7/1/24 #90/3    Last Lipid Panel:    Lab Results   Component Value Date/Time    CHOL 145 02/25/2025 07:44 AM    TRIG 83 02/25/2025 07:44 AM    HDL 50 02/25/2025 07:44 AM     Last CMP:   Lab Results   Component Value Date     02/25/2025    K 5.2 02/25/2025     02/25/2025    CO2 28 02/25/2025    BUN 13 02/25/2025    CREATININE 0.79 02/25/2025    GLUCOSE 106 (H) 02/25/2025    CALCIUM 9.2 02/25/2025    BILITOT 0.7 02/25/2025    ALKPHOS 102 02/25/2025    AST 12 02/25/2025    ALT 11 02/25/2025    AGRATIO 1.5 03/01/2023     Rx verified, ordered and set to EP.

## 2025-08-27 ENCOUNTER — OFFICE VISIT (OUTPATIENT)
Dept: FAMILY MEDICINE CLINIC | Age: 65
End: 2025-08-27
Payer: MEDICARE

## 2025-08-27 VITALS
BODY MASS INDEX: 56.18 KG/M2 | TEMPERATURE: 97.9 F | SYSTOLIC BLOOD PRESSURE: 134 MMHG | RESPIRATION RATE: 18 BRPM | HEART RATE: 76 BPM | WEIGHT: 260.4 LBS | DIASTOLIC BLOOD PRESSURE: 74 MMHG | HEIGHT: 57 IN

## 2025-08-27 DIAGNOSIS — H26.9 CATARACT OF BOTH EYES, UNSPECIFIED CATARACT TYPE: ICD-10-CM

## 2025-08-27 DIAGNOSIS — R73.01 IFG (IMPAIRED FASTING GLUCOSE): Primary | ICD-10-CM

## 2025-08-27 DIAGNOSIS — J44.9 CHRONIC OBSTRUCTIVE PULMONARY DISEASE, UNSPECIFIED COPD TYPE (HCC): ICD-10-CM

## 2025-08-27 DIAGNOSIS — Z72.0 TOBACCO ABUSE: ICD-10-CM

## 2025-08-27 DIAGNOSIS — E78.5 HYPERLIPIDEMIA, UNSPECIFIED HYPERLIPIDEMIA TYPE: ICD-10-CM

## 2025-08-27 DIAGNOSIS — E03.9 HYPOTHYROIDISM, UNSPECIFIED TYPE: ICD-10-CM

## 2025-08-27 DIAGNOSIS — M54.50 BILATERAL LOW BACK PAIN, UNSPECIFIED CHRONICITY, UNSPECIFIED WHETHER SCIATICA PRESENT: ICD-10-CM

## 2025-08-27 DIAGNOSIS — E66.01 MORBID OBESITY (HCC): ICD-10-CM

## 2025-08-27 DIAGNOSIS — H93.13 TINNITUS OF BOTH EARS: ICD-10-CM

## 2025-08-27 DIAGNOSIS — I10 PRIMARY HYPERTENSION: ICD-10-CM

## 2025-08-27 LAB
ESTIMATED AVERAGE GLUCOSE: 128 MG/DL
HBA1C MFR BLD: 6.1 %
T4 FREE: 1.3 NG/DL (ref 0.8–1.9)
TSH SERPL DL<=0.05 MIU/L-ACNC: 5.8 UIU/ML (ref 0.27–4.2)

## 2025-08-27 PROCEDURE — 3017F COLORECTAL CA SCREEN DOC REV: CPT | Performed by: NURSE PRACTITIONER

## 2025-08-27 PROCEDURE — G8427 DOCREV CUR MEDS BY ELIG CLIN: HCPCS | Performed by: NURSE PRACTITIONER

## 2025-08-27 PROCEDURE — 1123F ACP DISCUSS/DSCN MKR DOCD: CPT | Performed by: NURSE PRACTITIONER

## 2025-08-27 PROCEDURE — 99214 OFFICE O/P EST MOD 30 MIN: CPT | Performed by: NURSE PRACTITIONER

## 2025-08-27 PROCEDURE — 1160F RVW MEDS BY RX/DR IN RCRD: CPT | Performed by: NURSE PRACTITIONER

## 2025-08-27 PROCEDURE — G8417 CALC BMI ABV UP PARAM F/U: HCPCS | Performed by: NURSE PRACTITIONER

## 2025-08-27 PROCEDURE — 3023F SPIROM DOC REV: CPT | Performed by: NURSE PRACTITIONER

## 2025-08-27 PROCEDURE — 3078F DIAST BP <80 MM HG: CPT | Performed by: NURSE PRACTITIONER

## 2025-08-27 PROCEDURE — G8400 PT W/DXA NO RESULTS DOC: HCPCS | Performed by: NURSE PRACTITIONER

## 2025-08-27 PROCEDURE — 1159F MED LIST DOCD IN RCRD: CPT | Performed by: NURSE PRACTITIONER

## 2025-08-27 PROCEDURE — 1090F PRES/ABSN URINE INCON ASSESS: CPT | Performed by: NURSE PRACTITIONER

## 2025-08-27 PROCEDURE — 4004F PT TOBACCO SCREEN RCVD TLK: CPT | Performed by: NURSE PRACTITIONER

## 2025-08-27 PROCEDURE — 3075F SYST BP GE 130 - 139MM HG: CPT | Performed by: NURSE PRACTITIONER

## 2025-08-27 PROCEDURE — G2211 COMPLEX E/M VISIT ADD ON: HCPCS | Performed by: NURSE PRACTITIONER

## 2025-08-27 RX ORDER — LEVOTHYROXINE SODIUM 88 UG/1
88 TABLET ORAL DAILY
Qty: 90 TABLET | Refills: 0 | Status: SHIPPED | OUTPATIENT
Start: 2025-08-27

## 2025-08-27 RX ORDER — MOXIFLOXACIN 5 MG/ML
2 SOLUTION/ DROPS OPHTHALMIC 3 TIMES DAILY
COMMUNITY
Start: 2025-08-06

## 2025-08-27 RX ORDER — PREDNISOLONE ACETATE 10 MG/ML
2 SUSPENSION/ DROPS OPHTHALMIC 3 TIMES DAILY
COMMUNITY
Start: 2025-08-06

## 2025-08-27 ASSESSMENT — ENCOUNTER SYMPTOMS
BLOOD IN STOOL: 0
CONSTIPATION: 0
NAUSEA: 0
VOMITING: 0
SHORTNESS OF BREATH: 1
DIARRHEA: 0